# Patient Record
Sex: FEMALE | Race: WHITE | NOT HISPANIC OR LATINO | Employment: FULL TIME | URBAN - METROPOLITAN AREA
[De-identification: names, ages, dates, MRNs, and addresses within clinical notes are randomized per-mention and may not be internally consistent; named-entity substitution may affect disease eponyms.]

---

## 2017-05-05 ENCOUNTER — APPOINTMENT (OUTPATIENT)
Dept: WOMENS IMAGING | Facility: HOSPITAL | Age: 57
End: 2017-05-05

## 2017-05-05 PROCEDURE — 77063 BREAST TOMOSYNTHESIS BI: CPT | Performed by: RADIOLOGY

## 2017-05-05 PROCEDURE — 77067 SCR MAMMO BI INCL CAD: CPT | Performed by: RADIOLOGY

## 2017-05-05 PROCEDURE — 76642 ULTRASOUND BREAST LIMITED: CPT | Performed by: RADIOLOGY

## 2017-05-05 PROCEDURE — G0202 SCR MAMMO BI INCL CAD: HCPCS | Performed by: RADIOLOGY

## 2017-11-01 ENCOUNTER — OFFICE (OUTPATIENT)
Dept: URBAN - METROPOLITAN AREA CLINIC 42 | Facility: CLINIC | Age: 57
End: 2017-11-01

## 2017-11-01 VITALS
HEART RATE: 71 BPM | DIASTOLIC BLOOD PRESSURE: 85 MMHG | WEIGHT: 270 LBS | SYSTOLIC BLOOD PRESSURE: 135 MMHG | HEIGHT: 67 IN

## 2017-11-01 DIAGNOSIS — R94.5 ABNORMAL RESULTS OF LIVER FUNCTION STUDIES: ICD-10-CM

## 2017-11-01 DIAGNOSIS — K75.81 NONALCOHOLIC STEATOHEPATITIS (NASH): ICD-10-CM

## 2017-11-01 DIAGNOSIS — E66.9 OBESITY, UNSPECIFIED: ICD-10-CM

## 2017-11-01 PROCEDURE — 99243 OFF/OP CNSLTJ NEW/EST LOW 30: CPT

## 2017-11-01 RX ORDER — PENTOXIFYLLINE 400 MG/1
800 TABLET, EXTENDED RELEASE ORAL
Qty: 180 | Refills: 2 | Status: ACTIVE
Start: 2017-11-01

## 2017-11-01 RX ORDER — ZINC GLUCONATE 50 MG
50 TABLET ORAL
Qty: 90 | Refills: 3 | Status: ACTIVE
Start: 2017-11-01

## 2018-06-19 ENCOUNTER — APPOINTMENT (OUTPATIENT)
Dept: WOMENS IMAGING | Facility: HOSPITAL | Age: 58
End: 2018-06-19

## 2018-06-19 PROCEDURE — 77066 DX MAMMO INCL CAD BI: CPT | Performed by: RADIOLOGY

## 2018-06-19 PROCEDURE — G0279 TOMOSYNTHESIS, MAMMO: HCPCS | Performed by: RADIOLOGY

## 2018-06-19 PROCEDURE — 76641 ULTRASOUND BREAST COMPLETE: CPT | Performed by: RADIOLOGY

## 2018-06-19 PROCEDURE — 77062 BREAST TOMOSYNTHESIS BI: CPT | Performed by: RADIOLOGY

## 2018-09-17 ENCOUNTER — TELEPHONE (OUTPATIENT)
Dept: GASTROENTEROLOGY | Facility: CLINIC | Age: 58
End: 2018-09-17

## 2018-09-17 NOTE — TELEPHONE ENCOUNTER
Left message for patient to call back regarding upcoming OV. Need to ask if she has been seen anywhere for fatty liver so we can request notes/imaging.

## 2018-11-06 ENCOUNTER — OFFICE VISIT (OUTPATIENT)
Dept: FAMILY MEDICINE CLINIC | Facility: CLINIC | Age: 58
End: 2018-11-06

## 2018-11-06 VITALS
BODY MASS INDEX: 40.81 KG/M2 | WEIGHT: 260 LBS | HEART RATE: 72 BPM | RESPIRATION RATE: 18 BRPM | HEIGHT: 67 IN | DIASTOLIC BLOOD PRESSURE: 80 MMHG | SYSTOLIC BLOOD PRESSURE: 124 MMHG | OXYGEN SATURATION: 98 %

## 2018-11-06 DIAGNOSIS — E11.9 TYPE 2 DIABETES MELLITUS WITHOUT COMPLICATION, WITHOUT LONG-TERM CURRENT USE OF INSULIN (HCC): ICD-10-CM

## 2018-11-06 DIAGNOSIS — R10.9 FLANK PAIN: ICD-10-CM

## 2018-11-06 DIAGNOSIS — R35.0 URINARY FREQUENCY: Primary | ICD-10-CM

## 2018-11-06 LAB
BILIRUB BLD-MCNC: NEGATIVE MG/DL
CLARITY, POC: CLEAR
COLOR UR: YELLOW
GLUCOSE UR STRIP-MCNC: ABNORMAL MG/DL
KETONES UR QL: NEGATIVE
LEUKOCYTE EST, POC: NEGATIVE
NITRITE UR-MCNC: NEGATIVE MG/ML
PH UR: 6 [PH] (ref 5–8)
PROT UR STRIP-MCNC: NEGATIVE MG/DL
RBC # UR STRIP: NEGATIVE /UL
SP GR UR: 1.01 (ref 1–1.03)
UROBILINOGEN UR QL: NORMAL

## 2018-11-06 PROCEDURE — 81002 URINALYSIS NONAUTO W/O SCOPE: CPT | Performed by: FAMILY MEDICINE

## 2018-11-06 PROCEDURE — 99203 OFFICE O/P NEW LOW 30 MIN: CPT | Performed by: FAMILY MEDICINE

## 2018-11-06 RX ORDER — METFORMIN HYDROCHLORIDE 500 MG/1
500 TABLET, FILM COATED, EXTENDED RELEASE ORAL 2 TIMES DAILY
COMMUNITY
End: 2019-02-25 | Stop reason: SDUPTHER

## 2018-11-06 RX ORDER — NITROFURANTOIN 25; 75 MG/1; MG/1
100 CAPSULE ORAL EVERY 12 HOURS SCHEDULED
Qty: 6 CAPSULE | Refills: 0 | Status: SHIPPED | OUTPATIENT
Start: 2018-11-06 | End: 2018-11-09

## 2018-11-06 RX ORDER — DESVENLAFAXINE SUCCINATE 50 MG/1
50 TABLET, EXTENDED RELEASE ORAL
COMMUNITY
End: 2019-02-25 | Stop reason: SDUPTHER

## 2018-11-06 RX ORDER — LISINOPRIL 10 MG/1
10 TABLET ORAL DAILY
COMMUNITY
End: 2019-01-22 | Stop reason: SDUPTHER

## 2018-11-06 NOTE — PROGRESS NOTES
Subjective   Jemal Serrano is a 58 y.o. female.     Chief Complaint   Patient presents with   • Urinary Frequency   • Nocturia   • Establish Care       HPI     Possible UTI:  -started 1 week ago  -urinary frequency & urgency but no dysuria  -mild nocturia  -R flank pain, which is how previous kidney infections have presented, though this pain is better today compared to the last few days  -no hx of kidney stones    PMH also notable for HTN and T2DM    HTN:  -previously treated with lisinopril-HCTZ which was changed to plain lisinopril 10 mg daily a few weeks ago due to iatrogenic hypotension and dizziness  -she is tolerating this regimen well and dizziness has resolved  -no chest pain, dizziness, headaches, or acute vision changes     Type 2 diabetes:  -diagnosed a few years ago  -she has lost 30 lb with dietary changes in the last year  -A1C 8.4% on 10/19/18, outside labs reviewed and scanned  -she had taken herself off metformin for about 6 months and resumed taking 500 mg once daily about 1 week ago    Transferring care from [facility]  She follows at [facility] for OB/GYN care         Review of Systems   Constitutional: Positive for fatigue. Negative for appetite change and fever.   HENT: Negative for congestion and sore throat.    Eyes: Negative for pain and visual disturbance.   Respiratory: Negative for cough and shortness of breath.    Cardiovascular: Negative for chest pain and palpitations.   Gastrointestinal: Negative for abdominal pain, diarrhea, nausea and vomiting.   Genitourinary: Positive for frequency and urgency. Negative for dysuria, hematuria and vaginal discharge (no itching).   Skin: Negative for rash and wound.   Neurological: Negative for dizziness and headaches.       The following portions of the patient's history were reviewed and updated as appropriate: allergies, current medications, past family history, past medical history, past social history, past  surgical history and problem list.    Past Medical History:   Diagnosis Date   • Aortic aneurysm (CMS/HCC)     followed by [facility]   • Depression    • Diabetes mellitus (CMS/HCC)    • Fatty liver    • Hypertension    • Melanoma (CMS/HCC)     s/p surgical removal, followed by [facility]   • Pyelonephritis    • Sleep apnea     compliant with CPAP   • Visual impairment     followed by [facility]     Past Surgical History:   Procedure Laterality Date   •  SECTION      x 2   • HYSTERECTOMY      Complete hysterectomy due to a large ovarian cyst and menorrhagia   • LUMBAR DISC SURGERY     • UMBILICAL HERNIA REPAIR       Family History   Problem Relation Age of Onset   • Irregular heart beat Mother    • Hypertension Father    • Breast cancer Sister    • Irregular heart beat Sister    • Leukemia Maternal Grandmother    • Heart attack Paternal Grandfather        Allergies   Allergen Reactions   • Adhesive Tape Other (See Comments)     Redness when left on a while CAN USE PAPER TAPE   • Codeine Nausea And Vomiting   • Latex Rash   • Sulfa Antibiotics Itching          Current Outpatient Prescriptions:   •  desvenlafaxine (PRISTIQ) 50 MG 24 hr tablet, Take 50 mg by mouth., Disp: , Rfl:   •  lisinopril (PRINIVIL,ZESTRIL) 10 MG tablet, Take 10 mg by mouth Daily., Disp: , Rfl:   •  metFORMIN (GLUMETZA) 500 MG (MOD) 24 hr tablet, Take 500 mg by mouth 2 (Two) Times a Day., Disp: , Rfl:     Objective     Vitals:    18 1315   BP: 124/80   Pulse: 72   Resp: 18   SpO2: 98%     BMI 41    Physical Exam   Constitutional: She appears well-developed and well-nourished. No distress.   HENT:   Head: Normocephalic and atraumatic.   Cardiovascular: Normal rate, regular rhythm and normal heart sounds.  Exam reveals no gallop and no friction rub.    No murmur heard.  Pulmonary/Chest: Effort normal and breath sounds normal. No respiratory distress. She has no wheezes. She has no rhonchi. She has no rales.    Abdominal: Soft. Bowel sounds are normal. She exhibits no distension. There is tenderness in the suprapubic area. There is no rigidity, no rebound, no guarding and no CVA tenderness.   Skin: Skin is warm and dry.       ASSESSMENT/PLAN             Visit Diagnoses     Urinary frequency  and R Flank pain      Relevant Medications    Will treat empirically for a UTI with a 3 day course of nitrofurantoin, macrocrystal-monohydrate, (MACROBID) 100 MG BID, pending culture results    Other Relevant Orders    POC Urinalysis Dipstick (Completed) with 2+ glucose    Urine Culture - Urine, Urine, Clean Catch                      Type 2 diabetes mellitus without complication, without long-term current use of insulin (CMS/[facility])        Relevant Medications    Increase metFORMIN (GLUMETZA) to 500 MG BID  microalbumin/Cr          Records requested    Patient Instructions   Increase your metformin to 500 mg twice daily.      Return in about 3 months (around 2/6/2019) for Recheck T2DM.      Nilda Nayak MD  11/06/18

## 2018-11-07 LAB
ALBUMIN/CREAT UR: <5.8 MG/G CREAT (ref 0–30)
CREAT UR-MCNC: 51.5 MG/DL
MICROALBUMIN UR-MCNC: <3 UG/ML

## 2018-11-08 LAB
BACTERIA UR CULT: NORMAL
BACTERIA UR CULT: NORMAL

## 2018-11-20 ENCOUNTER — HOSPITAL ENCOUNTER (OUTPATIENT)
Dept: GENERAL RADIOLOGY | Facility: HOSPITAL | Age: 58
Discharge: HOME | End: 2018-11-20
Admitting: FAMILY MEDICINE

## 2018-11-20 ENCOUNTER — OFFICE VISIT (OUTPATIENT)
Dept: FAMILY MEDICINE CLINIC | Facility: CLINIC | Age: 58
End: 2018-11-20

## 2018-11-20 VITALS
BODY MASS INDEX: 40.02 KG/M2 | SYSTOLIC BLOOD PRESSURE: 126 MMHG | OXYGEN SATURATION: 98 % | RESPIRATION RATE: 13 BRPM | HEART RATE: 57 BPM | HEIGHT: 67 IN | WEIGHT: 255 LBS | DIASTOLIC BLOOD PRESSURE: 86 MMHG

## 2018-11-20 DIAGNOSIS — E11.42 DIABETIC POLYNEUROPATHY ASSOCIATED WITH TYPE 2 DIABETES MELLITUS (HCC): ICD-10-CM

## 2018-11-20 DIAGNOSIS — M25.572 ACUTE LEFT ANKLE PAIN: ICD-10-CM

## 2018-11-20 DIAGNOSIS — M25.572 ACUTE LEFT ANKLE PAIN: Primary | ICD-10-CM

## 2018-11-20 PROCEDURE — 73610 X-RAY EXAM OF ANKLE: CPT

## 2018-11-20 PROCEDURE — 99213 OFFICE O/P EST LOW 20 MIN: CPT | Performed by: FAMILY MEDICINE

## 2018-11-20 NOTE — PROGRESS NOTES
Subjective   Jemal Serrano is a 58 y.o. female.     Chief Complaint   Patient presents with   • Foot Pain       HPI     Left foot pain:  -started about 5 days ago  -pain is located toward the anterior of the left ankle  -associated with swelling which worsens by the end of the day  -no known injuries  -no open wounds  -she does have a hx of T2DM but no known hx of neuropathy  -no difficulty with ambulation       Review of Systems   Constitutional: Negative for fatigue and fever.   HENT: Negative for congestion and sore throat.    Respiratory: Negative for cough and shortness of breath.    Cardiovascular: Negative for chest pain and palpitations.   Gastrointestinal: Negative for abdominal pain and nausea.   Musculoskeletal: Positive for arthralgias.   Neurological: Negative for dizziness and headaches.       The following portions of the patient's history were reviewed and updated as appropriate: allergies, current medications, past family history, past medical history, past social history, past surgical history and problem list.    Past Medical History:   Diagnosis Date   • Aortic aneurysm (CMS/HCC)     followed by [facility]   • Depression    • Diabetes mellitus (CMS/HCC)    • Fatty liver    • Hypertension    • Melanoma (CMS/HCC)     s/p surgical removal, followed by [facility]   • Pyelonephritis    • Sleep apnea     compliant with CPAP   • Visual impairment     followed by [facility]     Past Surgical History:   Procedure Laterality Date   •  SECTION      x 2   • HYSTERECTOMY      Complete hysterectomy due to a large ovarian cyst and menorrhagia   • LUMBAR DISC SURGERY     • UMBILICAL HERNIA REPAIR         Social History     Tobacco Use   • Smoking status: Never Smoker   • Smokeless tobacco: Never Used   Substance and Sexual Activity   • Alcohol use: No   • Drug use: Not on file   \    Allergies   Allergen Reactions   • Adhesive Tape Other (See Comments)     Redness when left on a  while CAN USE PAPER TAPE   • Codeine Nausea And Vomiting   • Latex Rash   • Sulfa Antibiotics Itching        Outpatient Medications Prior to Visit   Medication Sig Dispense Refill   • desvenlafaxine (PRISTIQ) 50 MG 24 hr tablet Take 50 mg by mouth.     • lisinopril (PRINIVIL,ZESTRIL) 10 MG tablet Take 10 mg by mouth Daily.     • metFORMIN (GLUMETZA) 500 MG (MOD) 24 hr tablet Take 500 mg by mouth 2 (Two) Times a Day.       No facility-administered medications prior to visit.        Objective     Vitals:    11/20/18 0759   BP: 126/86   Pulse: 57   Resp: 13   SpO2: 98%       Physical Exam   Constitutional: She appears well-developed and well-nourished. No distress.   HENT:   Head: Normocephalic and atraumatic.   Cardiovascular: Normal rate, regular rhythm and normal heart sounds. Exam reveals no gallop and no friction rub.   No murmur heard.  Pulmonary/Chest: Effort normal and breath sounds normal. No respiratory distress. She has no wheezes. She has no rhonchi. She has no rales.    Jemal had a diabetic foot exam performed today.   During the foot exam she had a monofilament test performed (diminished sensation over bilateral heels).  Vascular Status -  Her right foot exhibits normal foot vasculature  and no edema. Her left foot exhibits abnormal foot edema (trace). Her left foot exhibits normal foot vasculature .  Skin Integrity  -  Her right foot skin is intact.Her left foot skin is intact (mild bruising of left anterior ankle but no point tenderness)..  Skin: Skin is warm and dry.       ASSESSMENT/PLAN             Visit Diagnoses     Acute left ankle pain    -  Primary, exam most consistent with a mild sprain, but will obtain plain films to rule out occult fracture    Relevant Orders    XR Ankle 3+ View Left    Diabetic polyneuropathy associated with type 2 diabetes mellitus (CMS/[facility])        Relevant Orders    XR Ankle 3+ View Left            Patient Instructions   Ice your ankle 15-20 minutes each night, wrap the  ankle with an ACE bandage, elevated the foot while sitting, and try to rest until the swelling goes down completely.       AlternateTylenol and Ibuprofen every 4-6 hours as needed for pain.    Wash and inspect your feet for wounds every night.      Ankle Sprain  An ankle sprain is a stretch or tear in one of the tough, fiber-like tissues (ligaments) in the ankle. The ligaments in your ankle help to hold the bones of the ankle together.  What are the causes?  This condition is often caused by stepping on or falling on the outer edge of the foot.  What increases the risk?  This condition is more likely to develop in people who play sports.  What are the signs or symptoms?  Symptoms of this condition include:  · Pain in your ankle.  · Swelling.  · Bruising. Bruising may develop right after you sprain your ankle or 1-2 days later.  · Trouble standing or walking, especially when you turn or change directions.    How is this diagnosed?  This condition is diagnosed with a physical exam. During the exam, your health care provider will press on certain parts of your foot and ankle and try to move them in certain ways. X-rays may be taken to see how severe the sprain is and to check for broken bones.  How is this treated?  This condition may be treated with:  · A brace. This is used to keep the ankle from moving until it heals.  · An elastic bandage. This is used to support the ankle.  · Crutches.  · Pain medicine.  · Surgery. This may be needed if the sprain is severe.  · Physical therapy. This may help to improve the range of motion in the ankle.    Follow these instructions at home:  · Rest your ankle.  · Take over-the-counter and prescription medicines only as told by your health care provider.  · For 2-3 days, keep your ankle raised (elevated) above the level of your heart as much as possible.  · If directed, apply ice to the area:  ? Put ice in a plastic bag.  ? Place a towel between your skin and the bag.  ? Leave the  ice on for 20 minutes, 2-3 times a day.  · If you were given a brace:  ? Wear it as directed.  ? Remove it to shower or bathe.  ? Try not to move your ankle much, but wiggle your toes from time to time. This helps to prevent swelling.  · If you were given an elastic bandage (dressing):  ? Remove it to shower or bathe.  ? Try not to move your ankle much, but wiggle your toes from time to time. This helps to prevent swelling.  ? Adjust the dressing to make it more comfortable if it feels too tight.  ? Loosen the dressing if you have numbness or tingling in your foot, or if your foot becomes cold and blue.  · If you have crutches, use them as told by your health care provider. Continue to use them until you can walk without feeling pain in your ankle.  Contact a health care provider if:  · You have rapidly increasing bruising or swelling.  · Your pain is not relieved with medicine.  Get help right away if:  · Your toes or foot becomes numb or blue.  · You have severe pain that gets worse.  This information is not intended to replace advice given to you by your health care provider. Make sure you discuss any questions you have with your health care provider.  Document Released: 12/18/2006 Document Revised: 01/25/2018 Document Reviewed: 07/19/2016  [employer] Interactive Patient Education © 2018 [employer]        Diabetic Neuropathy  Diabetic neuropathy is a nerve disease or nerve damage that is caused by diabetes mellitus. About half of all people with diabetes mellitus have some form of nerve damage. Nerve damage is more common in those who have had diabetes mellitus for many years and who generally have not had good control of their blood sugar (glucose) level. Diabetic neuropathy is a common complication of diabetes mellitus. There are three common types of diabetic neuropathy and a fourth type that is less common and less understood:  · Peripheral neuropathy--This is the most common type of diabetic neuropathy. It  causes damage to the nerves of the feet and legs first and then eventually the hands and arms. The damage affects the ability to sense touch.  · Autonomic neuropathy--This type causes damage to the autonomic nervous system, which controls the following functions:  ? Heartbeat.  ? Body temperature.  ? Blood pressure.  ? Urination.  ? Digestion.  ? Sweating.  ? Sexual function.  · Focal neuropathy--Focal neuropathy can be painful and unpredictable and occurs most often in older adults with diabetes mellitus. It involves a specific nerve or one area and often comes on suddenly. It usually does not cause long-term problems.  · Radiculoplexus neuropathy-- Sometimes called lumbosacral radiculoplexus neuropathy, radiculoplexus neuropathy affects the nerves of the thighs, hips, buttocks, or legs. It is more common in people with type 2 diabetes mellitus and in older men. It is characterized by debilitating pain, weakness, and atrophy, usually in the thigh muscles.    What are the causes?  The cause of peripheral, autonomic, and focal neuropathies is diabetes mellitus that is uncontrolled and high glucose levels. The cause of radiculoplexus neuropathy is unknown. However, it is thought to be caused by inflammation related to uncontrolled glucose levels.  What are the signs or symptoms?  Peripheral Neuropathy  Peripheral neuropathy develops slowly over time. When the nerves of the feet and legs no longer work there may be:  · Burning, stabbing, or aching pain in the legs or feet.  · Inability to feel pressure or pain in your feet. This can lead to:  ? Thick calluses over pressure areas.  ? Pressure sores.  ? Ulcers.  · Foot deformities.  · Reduced ability to feel temperature changes.  · Muscle weakness.    Autonomic Neuropathy  The symptoms of autonomic neuropathy vary depending on which nerves are affected. Symptoms may include:  · Problems with digestion, such as:  ? Feeling sick to your stomach  (nausea).  ? Vomiting.  ? Bloating.  ? Constipation.  ? Diarrhea.  ? Abdominal pain.  · Difficulty with urination. This occurs if you lose your ability to sense when your bladder is full. Problems include:  ? Urine leakage (incontinence).  ? Inability to empty your bladder completely (retention).  · Rapid or irregular heartbeat (palpitations).  · Blood pressure drops when you stand up (orthostatic hypotension). When you stand up you may feel:  ? Dizzy.  ? Weak.  ? Faint.  · In men, inability to attain and maintain an erection.  · In women, vaginal dryness and problems with decreased sexual desire and arousal.  · Problems with body temperature regulation.  · Increased or decreased sweating.    Focal Neuropathy  · Abnormal eye movements or abnormal alignment of both eyes.  · Weakness in the wrist.  · Foot drop. This results in an inability to lift the foot properly and abnormal walking or foot movement.  · Paralysis on one side of your face (Bell palsy).  · Chest or abdominal pain.  Radiculoplexus Neuropathy  · Sudden, severe pain in your hip, thigh, or buttocks.  · Weakness and wasting of thigh muscles.  · Difficulty rising from a seated position.  · Abdominal swelling.  · Unexplained weight loss (usually more than 10 lb [4.5 kg]).  How is this diagnosed?  Peripheral Neuropathy  Your senses may be tested. Sensory function testing can be done with:  · A light touch using a monofilament.  · A vibration with tuning fork.  · A sharp sensation with a pin prick.    Other tests that can help diagnose neuropathy are:  · Nerve conduction velocity. This test checks the transmission of an electrical current through a nerve.  · Electromyography. This shows how muscles respond to electrical signals transmitted by nearby nerves.  · Quantitative sensory testing. This is used to assess how your nerves respond to vibrations and changes in temperature.    Autonomic Neuropathy  Diagnosis is often based on reported symptoms. Tell your  health care provider if you experience:  · Dizziness.  · Constipation.  · Diarrhea.  · Inappropriate urination or inability to urinate.  · Inability to get or maintain an erection.    Tests that may be done include:  · Electrocardiography or Holter monitor. These are tests that can help show problems with the heart rate or heart rhythm.  · An X-ray exam may be done.    Focal Neuropathy  Diagnosis is made based on your symptoms and what your health care provider finds during your exam. Other tests may be done. They may include:  · Nerve conduction velocities. This checks the transmission of electrical current through a nerve.  · Electromyography. This shows how muscles respond to electrical signals transmitted by nearby nerves.  · Quantitative sensory testing. This test is used to assess how your nerves respond to vibration and changes in temperature.    Radiculoplexus Neuropathy  · Often the first thing is to eliminate any other issue or problems that might be the cause, as there is no standard test for diagnosis.  · X-ray exam of your spine and lumbar region.  · Spinal tap to rule out cancer.  · MRI to rule out other lesions.  How is this treated?  Once nerve damage occurs, it cannot be reversed. The goal of treatment is to keep the disease or nerve damage from getting worse and affecting more nerve fibers. Controlling your blood glucose level is the key. Most people with radiculoplexus neuropathy see at least a partial improvement over time. You will need to keep your blood glucose and HbA1c levels in the target range determined by your health care provider. Things that help control blood glucose levels include:  · Blood glucose monitoring.  · Meal planning.  · Physical activity.  · Diabetes medicine.    Over time, maintaining lower blood glucose levels helps lessen symptoms. Sometimes, prescription pain medicine is needed.  Follow these instructions at home:  · Do not smoke.  · Keep your blood glucose level in  the range that you and your health care provider have determined acceptable for you.  · Keep your blood pressure level in the range that you and your health care provider have determined acceptable for you.  · Eat a well-balanced diet.  · Be physically active every day. Include strength training and balance exercises.  · Protect your feet.  ? Check your feet every day for sores, cuts, blisters, or signs of infection.  ? Wear padded socks and supportive shoes. Use orthotic inserts, if necessary.  ? Regularly check the insides of your shoes for worn spots. Make sure there are no rocks or other items inside your shoes before you put them on.  Contact a health care provider if:  · You have burning, stabbing, or aching pain in the legs or feet.  · You are unable to feel pressure or pain in your feet.  · You develop problems with digestion such as:  ? Nausea.  ? Vomiting.  ? Bloating.  ? Constipation.  ? Diarrhea.  ? Abdominal pain.  · You have difficulty with urination, such as:  ? Incontinence.  ? Retention.  · You have palpitations.  · You develop orthostatic hypotension. When you stand up you may feel:  ? Dizzy.  ? Weak.  ? Faint.  · You cannot attain and maintain an erection (in men).  · You have vaginal dryness and problems with decreased sexual desire and arousal (in women).  · You have severe pain in your thighs, legs, or buttocks.  · You have unexplained weight loss.  This information is not intended to replace advice given to you by your health care provider. Make sure you discuss any questions you have with your health care provider.  Document Released: 02/26/2003 Document Revised: 05/25/2017 Document Reviewed: 05/29/2014  [employer] Interactive Patient Education © 2017 [employer]      Return in about 2 months (around 1/20/2019) for Recheck T2DM.      Nilda Nayak MD  11/20/18

## 2018-11-20 NOTE — PATIENT INSTRUCTIONS
Ice your ankle 15-20 minutes each night, wrap the ankle with an ACE bandage, elevated the foot while sitting, and try to rest until the swelling goes down completely.       AlternateTylenol and Ibuprofen every 4-6 hours as needed for pain.    Wash and inspect your feet for wounds every night.      Ankle Sprain  An ankle sprain is a stretch or tear in one of the tough, fiber-like tissues (ligaments) in the ankle. The ligaments in your ankle help to hold the bones of the ankle together.  What are the causes?  This condition is often caused by stepping on or falling on the outer edge of the foot.  What increases the risk?  This condition is more likely to develop in people who play sports.  What are the signs or symptoms?  Symptoms of this condition include:  · Pain in your ankle.  · Swelling.  · Bruising. Bruising may develop right after you sprain your ankle or 1-2 days later.  · Trouble standing or walking, especially when you turn or change directions.    How is this diagnosed?  This condition is diagnosed with a physical exam. During the exam, your health care provider will press on certain parts of your foot and ankle and try to move them in certain ways. X-rays may be taken to see how severe the sprain is and to check for broken bones.  How is this treated?  This condition may be treated with:  · A brace. This is used to keep the ankle from moving until it heals.  · An elastic bandage. This is used to support the ankle.  · Crutches.  · Pain medicine.  · Surgery. This may be needed if the sprain is severe.  · Physical therapy. This may help to improve the range of motion in the ankle.    Follow these instructions at home:  · Rest your ankle.  · Take over-the-counter and prescription medicines only as told by your health care provider.  · For 2-3 days, keep your ankle raised (elevated) above the level of your heart as much as possible.  · If directed, apply ice to the area:  ? Put ice in a plastic bag.  ? Place a  towel between your skin and the bag.  ? Leave the ice on for 20 minutes, 2-3 times a day.  · If you were given a brace:  ? Wear it as directed.  ? Remove it to shower or bathe.  ? Try not to move your ankle much, but wiggle your toes from time to time. This helps to prevent swelling.  · If you were given an elastic bandage (dressing):  ? Remove it to shower or bathe.  ? Try not to move your ankle much, but wiggle your toes from time to time. This helps to prevent swelling.  ? Adjust the dressing to make it more comfortable if it feels too tight.  ? Loosen the dressing if you have numbness or tingling in your foot, or if your foot becomes cold and blue.  · If you have crutches, use them as told by your health care provider. Continue to use them until you can walk without feeling pain in your ankle.  Contact a health care provider if:  · You have rapidly increasing bruising or swelling.  · Your pain is not relieved with medicine.  Get help right away if:  · Your toes or foot becomes numb or blue.  · You have severe pain that gets worse.  This information is not intended to replace advice given to you by your health care provider. Make sure you discuss any questions you have with your health care provider.  Document Released: 12/18/2006 Document Revised: 01/25/2018 Document Reviewed: 07/19/2016  [employer] Interactive Patient Education © 2018 [employer]        Diabetic Neuropathy  Diabetic neuropathy is a nerve disease or nerve damage that is caused by diabetes mellitus. About half of all people with diabetes mellitus have some form of nerve damage. Nerve damage is more common in those who have had diabetes mellitus for many years and who generally have not had good control of their blood sugar (glucose) level. Diabetic neuropathy is a common complication of diabetes mellitus. There are three common types of diabetic neuropathy and a fourth type that is less common and less understood:  · Peripheral neuropathy--This  is the most common type of diabetic neuropathy. It causes damage to the nerves of the feet and legs first and then eventually the hands and arms. The damage affects the ability to sense touch.  · Autonomic neuropathy--This type causes damage to the autonomic nervous system, which controls the following functions:  ? Heartbeat.  ? Body temperature.  ? Blood pressure.  ? Urination.  ? Digestion.  ? Sweating.  ? Sexual function.  · Focal neuropathy--Focal neuropathy can be painful and unpredictable and occurs most often in older adults with diabetes mellitus. It involves a specific nerve or one area and often comes on suddenly. It usually does not cause long-term problems.  · Radiculoplexus neuropathy-- Sometimes called lumbosacral radiculoplexus neuropathy, radiculoplexus neuropathy affects the nerves of the thighs, hips, buttocks, or legs. It is more common in people with type 2 diabetes mellitus and in older men. It is characterized by debilitating pain, weakness, and atrophy, usually in the thigh muscles.    What are the causes?  The cause of peripheral, autonomic, and focal neuropathies is diabetes mellitus that is uncontrolled and high glucose levels. The cause of radiculoplexus neuropathy is unknown. However, it is thought to be caused by inflammation related to uncontrolled glucose levels.  What are the signs or symptoms?  Peripheral Neuropathy  Peripheral neuropathy develops slowly over time. When the nerves of the feet and legs no longer work there may be:  · Burning, stabbing, or aching pain in the legs or feet.  · Inability to feel pressure or pain in your feet. This can lead to:  ? Thick calluses over pressure areas.  ? Pressure sores.  ? Ulcers.  · Foot deformities.  · Reduced ability to feel temperature changes.  · Muscle weakness.    Autonomic Neuropathy  The symptoms of autonomic neuropathy vary depending on which nerves are affected. Symptoms may include:  · Problems with digestion, such  as:  ? Feeling sick to your stomach (nausea).  ? Vomiting.  ? Bloating.  ? Constipation.  ? Diarrhea.  ? Abdominal pain.  · Difficulty with urination. This occurs if you lose your ability to sense when your bladder is full. Problems include:  ? Urine leakage (incontinence).  ? Inability to empty your bladder completely (retention).  · Rapid or irregular heartbeat (palpitations).  · Blood pressure drops when you stand up (orthostatic hypotension). When you stand up you may feel:  ? Dizzy.  ? Weak.  ? Faint.  · In men, inability to attain and maintain an erection.  · In women, vaginal dryness and problems with decreased sexual desire and arousal.  · Problems with body temperature regulation.  · Increased or decreased sweating.    Focal Neuropathy  · Abnormal eye movements or abnormal alignment of both eyes.  · Weakness in the wrist.  · Foot drop. This results in an inability to lift the foot properly and abnormal walking or foot movement.  · Paralysis on one side of your face (Bell palsy).  · Chest or abdominal pain.  Radiculoplexus Neuropathy  · Sudden, severe pain in your hip, thigh, or buttocks.  · Weakness and wasting of thigh muscles.  · Difficulty rising from a seated position.  · Abdominal swelling.  · Unexplained weight loss (usually more than 10 lb [4.5 kg]).  How is this diagnosed?  Peripheral Neuropathy  Your senses may be tested. Sensory function testing can be done with:  · A light touch using a monofilament.  · A vibration with tuning fork.  · A sharp sensation with a pin prick.    Other tests that can help diagnose neuropathy are:  · Nerve conduction velocity. This test checks the transmission of an electrical current through a nerve.  · Electromyography. This shows how muscles respond to electrical signals transmitted by nearby nerves.  · Quantitative sensory testing. This is used to assess how your nerves respond to vibrations and changes in temperature.    Autonomic Neuropathy  Diagnosis is often  based on reported symptoms. Tell your health care provider if you experience:  · Dizziness.  · Constipation.  · Diarrhea.  · Inappropriate urination or inability to urinate.  · Inability to get or maintain an erection.    Tests that may be done include:  · Electrocardiography or Holter monitor. These are tests that can help show problems with the heart rate or heart rhythm.  · An X-ray exam may be done.    Focal Neuropathy  Diagnosis is made based on your symptoms and what your health care provider finds during your exam. Other tests may be done. They may include:  · Nerve conduction velocities. This checks the transmission of electrical current through a nerve.  · Electromyography. This shows how muscles respond to electrical signals transmitted by nearby nerves.  · Quantitative sensory testing. This test is used to assess how your nerves respond to vibration and changes in temperature.    Radiculoplexus Neuropathy  · Often the first thing is to eliminate any other issue or problems that might be the cause, as there is no standard test for diagnosis.  · X-ray exam of your spine and lumbar region.  · Spinal tap to rule out cancer.  · MRI to rule out other lesions.  How is this treated?  Once nerve damage occurs, it cannot be reversed. The goal of treatment is to keep the disease or nerve damage from getting worse and affecting more nerve fibers. Controlling your blood glucose level is the key. Most people with radiculoplexus neuropathy see at least a partial improvement over time. You will need to keep your blood glucose and HbA1c levels in the target range determined by your health care provider. Things that help control blood glucose levels include:  · Blood glucose monitoring.  · Meal planning.  · Physical activity.  · Diabetes medicine.    Over time, maintaining lower blood glucose levels helps lessen symptoms. Sometimes, prescription pain medicine is needed.  Follow these instructions at home:  · Do not  smoke.  · Keep your blood glucose level in the range that you and your health care provider have determined acceptable for you.  · Keep your blood pressure level in the range that you and your health care provider have determined acceptable for you.  · Eat a well-balanced diet.  · Be physically active every day. Include strength training and balance exercises.  · Protect your feet.  ? Check your feet every day for sores, cuts, blisters, or signs of infection.  ? Wear padded socks and supportive shoes. Use orthotic inserts, if necessary.  ? Regularly check the insides of your shoes for worn spots. Make sure there are no rocks or other items inside your shoes before you put them on.  Contact a health care provider if:  · You have burning, stabbing, or aching pain in the legs or feet.  · You are unable to feel pressure or pain in your feet.  · You develop problems with digestion such as:  ? Nausea.  ? Vomiting.  ? Bloating.  ? Constipation.  ? Diarrhea.  ? Abdominal pain.  · You have difficulty with urination, such as:  ? Incontinence.  ? Retention.  · You have palpitations.  · You develop orthostatic hypotension. When you stand up you may feel:  ? Dizzy.  ? Weak.  ? Faint.  · You cannot attain and maintain an erection (in men).  · You have vaginal dryness and problems with decreased sexual desire and arousal (in women).  · You have severe pain in your thighs, legs, or buttocks.  · You have unexplained weight loss.  This information is not intended to replace advice given to you by your health care provider. Make sure you discuss any questions you have with your health care provider.  Document Released: 02/26/2003 Document Revised: 05/25/2017 Document Reviewed: 05/29/2014  [employer] Interactive Patient Education © 2017 [employer]

## 2019-01-22 ENCOUNTER — TELEPHONE (OUTPATIENT)
Dept: FAMILY MEDICINE CLINIC | Facility: CLINIC | Age: 59
End: 2019-01-22

## 2019-01-22 RX ORDER — LISINOPRIL 10 MG/1
10 TABLET ORAL DAILY
Qty: 30 TABLET | Refills: 1 | Status: SHIPPED | OUTPATIENT
Start: 2019-01-22 | End: 2019-02-25 | Stop reason: SDUPTHER

## 2019-01-22 NOTE — TELEPHONE ENCOUNTER
Patient left a  wanting something called in for a sinus infection. She said it started out as a cold, but now the drainage has turned green. She reports that she is not running a fever or body aches, not even really having a sore throat.

## 2019-01-23 ENCOUNTER — OFFICE VISIT (OUTPATIENT)
Dept: FAMILY MEDICINE CLINIC | Facility: CLINIC | Age: 59
End: 2019-01-23

## 2019-01-23 VITALS
HEART RATE: 71 BPM | SYSTOLIC BLOOD PRESSURE: 126 MMHG | DIASTOLIC BLOOD PRESSURE: 86 MMHG | HEIGHT: 67 IN | OXYGEN SATURATION: 96 % | RESPIRATION RATE: 14 BRPM | BODY MASS INDEX: 39.93 KG/M2

## 2019-01-23 DIAGNOSIS — J02.9 PHARYNGITIS, UNSPECIFIED ETIOLOGY: ICD-10-CM

## 2019-01-23 DIAGNOSIS — J01.00 ACUTE MAXILLARY SINUSITIS, RECURRENCE NOT SPECIFIED: Primary | ICD-10-CM

## 2019-01-23 PROCEDURE — 99213 OFFICE O/P EST LOW 20 MIN: CPT | Performed by: FAMILY MEDICINE

## 2019-01-23 RX ORDER — AZITHROMYCIN 250 MG/1
TABLET, FILM COATED ORAL
Qty: 6 TABLET | Refills: 0 | Status: SHIPPED | OUTPATIENT
Start: 2019-01-23 | End: 2019-02-25

## 2019-01-23 NOTE — PROGRESS NOTES
Subjective   Jemal Serrano is a 58 y.o. female.     Chief Complaint   Patient presents with   • URI       HPI        Sick:  -started 5 days ago with clear rhinorrhea  -yesterday she developed sinus pressure and the mucus became thick and green  -associated with a dry cough  -no fevers  -sick contacts include 2 of her grandsons who attend   -she has been taking OTC Ibuprofen and Alkaseltzer plus with some improvement in symptoms      Review of Systems   Constitutional: Negative for appetite change and fever.   HENT: Positive for ear discharge, ear pain, postnasal drip, rhinorrhea, sinus pressure and sinus pain. Negative for sore throat.    Respiratory: Positive for cough. Negative for shortness of breath.    Cardiovascular: Negative for chest pain and palpitations.   Gastrointestinal: Negative for diarrhea and vomiting.   Genitourinary: Negative for dysuria.   Musculoskeletal: Negative for arthralgias and myalgias.   Skin: Negative for rash.   Neurological: Positive for headaches. Negative for dizziness.       The following portions of the patient's history were reviewed and updated as appropriate: allergies, current medications, past family history, past medical history, past social history, past surgical history and problem list.    Past Medical History:   Diagnosis Date   • Aortic aneurysm (CMS/HCC)     followed by [facility]   • Depression    • Diabetes mellitus (CMS/HCC)    • Fatty liver    • Hypertension    • Melanoma (CMS/HCC)     s/p surgical removal, followed by [facility]   • Pyelonephritis    • Sleep apnea     compliant with CPAP   • Visual impairment     followed by [facility]            Allergies   Allergen Reactions   • Adhesive Tape Other (See Comments)     Redness when left on a while CAN USE PAPER TAPE   • Codeine Nausea And Vomiting   • Latex Rash   • Sulfa Antibiotics Itching        Outpatient Medications Prior to Visit   Medication Sig Dispense Refill   •  desvenlafaxine (PRISTIQ) 50 MG 24 hr tablet Take 50 mg by mouth.     • lisinopril (PRINIVIL,ZESTRIL) 10 MG tablet Take 1 tablet by mouth Daily. 30 tablet 1   • metFORMIN (GLUMETZA) 500 MG (MOD) 24 hr tablet Take 500 mg by mouth 2 (Two) Times a Day.       No facility-administered medications prior to visit.        Objective     Vitals:    01/23/19 1137   BP: 126/86   Pulse: 71   Resp: 14   SpO2: 96%       Physical Exam   Constitutional: She appears well-developed and well-nourished. No distress.   HENT:   Head: Normocephalic and atraumatic.   Right Ear: Tympanic membrane and ear canal normal.   Left Ear: Tympanic membrane and ear canal normal.   Nose: Right sinus exhibits maxillary sinus tenderness. Left sinus exhibits maxillary sinus tenderness.   Mouth/Throat: Uvula is midline. Posterior oropharyngeal erythema present. Tonsils are 1+ on the right. Tonsils are 1+ on the left. Tonsillar exudate.   Neck: No thyromegaly present.   Cardiovascular: Normal rate, regular rhythm and normal heart sounds. Exam reveals no gallop and no friction rub.   No murmur heard.  Pulmonary/Chest: Effort normal and breath sounds normal. No respiratory distress. She has no wheezes. She has no rhonchi. She has no rales.   Abdominal: Soft. Bowel sounds are normal. She exhibits no distension. There is no tenderness.   Lymphadenopathy:     She has no cervical adenopathy.   Skin: Skin is warm and dry.       ASSESSMENT/PLAN       Problem List Items Addressed This Visit     None      Visit Diagnoses     Acute maxillary sinusitis, recurrence not specified    -  Primary    Relevant Medications    azithromycin (ZITHROMAX Z-ALIDA) 250 MG tablet    Pharyngitis, unspecified etiology        Relevant Medications    azithromycin (ZITHROMAX Z-ALIDA) 250 MG tablet            Patient Instructions   Stay home from work tomorrow    Sinusitis, Adult  Sinusitis is soreness and inflammation of your sinuses. Sinuses are hollow spaces in the bones around your face.  Your sinuses are located:  · Around your eyes.  · In the middle of your forehead.  · Behind your nose.  · In your cheekbones.    Your sinuses and nasal passages are lined with a stringy fluid (mucus). Mucus normally drains out of your sinuses. When your nasal tissues become inflamed or swollen, the mucus can become trapped or blocked so air cannot flow through your sinuses. This allows bacteria, viruses, and funguses to grow, which leads to infection.  Sinusitis can develop quickly and last for 7?10 days (acute) or for more than 12 weeks (chronic). Sinusitis often develops after a cold.  What are the causes?  This condition is caused by anything that creates swelling in the sinuses or stops mucus from draining, including:  · Allergies.  · Asthma.  · Bacterial or viral infection.  · Abnormally shaped bones between the nasal passages.  · Nasal growths that contain mucus (nasal polyps).  · Narrow sinus openings.  · Pollutants, such as chemicals or irritants in the air.  · A foreign object stuck in the nose.  · A fungal infection. This is rare.    What increases the risk?  The following factors may make you more likely to develop this condition:  · Having allergies or asthma.  · Having had a recent cold or respiratory tract infection.  · Having structural deformities or blockages in your nose or sinuses.  · Having a weak immune system.  · Doing a lot of swimming or diving.  · Overusing nasal sprays.  · Smoking.    What are the signs or symptoms?  The main symptoms of this condition are pain and a feeling of pressure around the affected sinuses. Other symptoms include:  · Upper toothache.  · Earache.  · Headache.  · Bad breath.  · Decreased sense of smell and taste.  · A cough that may get worse at night.  · Fatigue.  · Fever.  · Thick drainage from your nose. The drainage is often green and it may contain pus (purulent).  · Stuffy nose or congestion.  · Postnasal drip. This is when extra mucus collects in the throat or  back of the nose.  · Swelling and warmth over the affected sinuses.  · Sore throat.  · Sensitivity to light.    How is this diagnosed?  This condition is diagnosed based on symptoms, a medical history, and a physical exam. To find out if your condition is acute or chronic, your health care provider may:  · Look in your nose for signs of nasal polyps.  · Tap over the affected sinus to check for signs of infection.  · View the inside of your sinuses using an imaging device that has a light attached (endoscope).    If your health care provider suspects that you have chronic sinusitis, you may also:  · Be tested for allergies.  · Have a sample of mucus taken from your nose (nasal culture) and checked for bacteria.  · Have a mucus sample examined to see if your sinusitis is related to an allergy.    If your sinusitis does not respond to treatment and it lasts longer than 8 weeks, you may have an MRI or CT scan to check your sinuses. These scans also help to determine how severe your infection is.  In rare cases, a bone biopsy may be done to rule out more serious types of fungal sinus disease.  How is this treated?  Treatment for sinusitis depends on the cause and whether your condition is chronic or acute. If a virus is causing your sinusitis, your symptoms will go away on their own within 10 days. You may be given medicines to relieve your symptoms, including:  · Topical nasal decongestants. They shrink swollen nasal passages and let mucus drain from your sinuses.  · Antihistamines. These drugs block inflammation that is triggered by allergies. This can help to ease swelling in your nose and sinuses.  · Topical nasal corticosteroids. These are nasal sprays that ease inflammation and swelling in your nose and sinuses.  · Nasal saline washes. These rinses can help to get rid of thick mucus in your nose.    If your condition is caused by bacteria, you will be given an antibiotic medicine. If your condition is caused by a  fungus, you will be given an antifungal medicine.  Surgery may be needed to correct underlying conditions, such as narrow nasal passages. Surgery may also be needed to remove polyps.  Follow these instructions at home:  Medicines  · Take, use, or apply over-the-counter and prescription medicines only as told by your health care provider. These may include nasal sprays.  · If you were prescribed an antibiotic medicine, take it as told by your health care provider. Do not stop taking the antibiotic even if you start to feel better.  Hydrate and Humidify  · Drink enough water to keep your urine clear or pale yellow. Staying hydrated will help to thin your mucus.  · Use a cool mist humidifier to keep the humidity level in your home above 50%.  · Inhale steam for 10-15 minutes, 3-4 times a day or as told by your health care provider. You can do this in the bathroom while a hot shower is running.  · Limit your exposure to cool or dry air.  Rest  · Rest as much as possible.  · Sleep with your head raised (elevated).  · Make sure to get enough sleep each night.  General instructions  · Apply a warm, moist washcloth to your face 3-4 times a day or as told by your health care provider. This will help with discomfort.  · Wash your hands often with soap and water to reduce your exposure to viruses and other germs. If soap and water are not available, use hand .  · Do not smoke. Avoid being around people who are smoking (secondhand smoke).  · Keep all follow-up visits as told by your health care provider. This is important.  Contact a health care provider if:  · You have a fever.  · Your symptoms get worse.  · Your symptoms do not improve within 10 days.  Get help right away if:  · You have a severe headache.  · You have persistent vomiting.  · You have pain or swelling around your face or eyes.  · You have vision problems.  · You develop confusion.  · Your neck is stiff.  · You have trouble breathing.  This information  is not intended to replace advice given to you by your health care provider. Make sure you discuss any questions you have with your health care provider.  Document Released: 12/18/2006 Document Revised: 08/13/2017 Document Reviewed: 10/12/2016  [employer] Interactive Patient Education © 2018 [employer]      Return in about 1 month (around 2/23/2019), or if symptoms worsen or fail to improve, for Recheck diabetes.      Nilda Nayak MD  01/23/19

## 2019-01-23 NOTE — PATIENT INSTRUCTIONS
Stay home from work tomorrow    Sinusitis, Adult  Sinusitis is soreness and inflammation of your sinuses. Sinuses are hollow spaces in the bones around your face. Your sinuses are located:  · Around your eyes.  · In the middle of your forehead.  · Behind your nose.  · In your cheekbones.    Your sinuses and nasal passages are lined with a stringy fluid (mucus). Mucus normally drains out of your sinuses. When your nasal tissues become inflamed or swollen, the mucus can become trapped or blocked so air cannot flow through your sinuses. This allows bacteria, viruses, and funguses to grow, which leads to infection.  Sinusitis can develop quickly and last for 7?10 days (acute) or for more than 12 weeks (chronic). Sinusitis often develops after a cold.  What are the causes?  This condition is caused by anything that creates swelling in the sinuses or stops mucus from draining, including:  · Allergies.  · Asthma.  · Bacterial or viral infection.  · Abnormally shaped bones between the nasal passages.  · Nasal growths that contain mucus (nasal polyps).  · Narrow sinus openings.  · Pollutants, such as chemicals or irritants in the air.  · A foreign object stuck in the nose.  · A fungal infection. This is rare.    What increases the risk?  The following factors may make you more likely to develop this condition:  · Having allergies or asthma.  · Having had a recent cold or respiratory tract infection.  · Having structural deformities or blockages in your nose or sinuses.  · Having a weak immune system.  · Doing a lot of swimming or diving.  · Overusing nasal sprays.  · Smoking.    What are the signs or symptoms?  The main symptoms of this condition are pain and a feeling of pressure around the affected sinuses. Other symptoms include:  · Upper toothache.  · Earache.  · Headache.  · Bad breath.  · Decreased sense of smell and taste.  · A cough that may get worse at night.  · Fatigue.  · Fever.  · Thick drainage from your nose.  The drainage is often green and it may contain pus (purulent).  · Stuffy nose or congestion.  · Postnasal drip. This is when extra mucus collects in the throat or back of the nose.  · Swelling and warmth over the affected sinuses.  · Sore throat.  · Sensitivity to light.    How is this diagnosed?  This condition is diagnosed based on symptoms, a medical history, and a physical exam. To find out if your condition is acute or chronic, your health care provider may:  · Look in your nose for signs of nasal polyps.  · Tap over the affected sinus to check for signs of infection.  · View the inside of your sinuses using an imaging device that has a light attached (endoscope).    If your health care provider suspects that you have chronic sinusitis, you may also:  · Be tested for allergies.  · Have a sample of mucus taken from your nose (nasal culture) and checked for bacteria.  · Have a mucus sample examined to see if your sinusitis is related to an allergy.    If your sinusitis does not respond to treatment and it lasts longer than 8 weeks, you may have an MRI or CT scan to check your sinuses. These scans also help to determine how severe your infection is.  In rare cases, a bone biopsy may be done to rule out more serious types of fungal sinus disease.  How is this treated?  Treatment for sinusitis depends on the cause and whether your condition is chronic or acute. If a virus is causing your sinusitis, your symptoms will go away on their own within 10 days. You may be given medicines to relieve your symptoms, including:  · Topical nasal decongestants. They shrink swollen nasal passages and let mucus drain from your sinuses.  · Antihistamines. These drugs block inflammation that is triggered by allergies. This can help to ease swelling in your nose and sinuses.  · Topical nasal corticosteroids. These are nasal sprays that ease inflammation and swelling in your nose and sinuses.  · Nasal saline washes. These rinses can  help to get rid of thick mucus in your nose.    If your condition is caused by bacteria, you will be given an antibiotic medicine. If your condition is caused by a fungus, you will be given an antifungal medicine.  Surgery may be needed to correct underlying conditions, such as narrow nasal passages. Surgery may also be needed to remove polyps.  Follow these instructions at home:  Medicines  · Take, use, or apply over-the-counter and prescription medicines only as told by your health care provider. These may include nasal sprays.  · If you were prescribed an antibiotic medicine, take it as told by your health care provider. Do not stop taking the antibiotic even if you start to feel better.  Hydrate and Humidify  · Drink enough water to keep your urine clear or pale yellow. Staying hydrated will help to thin your mucus.  · Use a cool mist humidifier to keep the humidity level in your home above 50%.  · Inhale steam for 10-15 minutes, 3-4 times a day or as told by your health care provider. You can do this in the bathroom while a hot shower is running.  · Limit your exposure to cool or dry air.  Rest  · Rest as much as possible.  · Sleep with your head raised (elevated).  · Make sure to get enough sleep each night.  General instructions  · Apply a warm, moist washcloth to your face 3-4 times a day or as told by your health care provider. This will help with discomfort.  · Wash your hands often with soap and water to reduce your exposure to viruses and other germs. If soap and water are not available, use hand .  · Do not smoke. Avoid being around people who are smoking (secondhand smoke).  · Keep all follow-up visits as told by your health care provider. This is important.  Contact a health care provider if:  · You have a fever.  · Your symptoms get worse.  · Your symptoms do not improve within 10 days.  Get help right away if:  · You have a severe headache.  · You have persistent vomiting.  · You have pain  or swelling around your face or eyes.  · You have vision problems.  · You develop confusion.  · Your neck is stiff.  · You have trouble breathing.  This information is not intended to replace advice given to you by your health care provider. Make sure you discuss any questions you have with your health care provider.  Document Released: 12/18/2006 Document Revised: 08/13/2017 Document Reviewed: 10/12/2016  [employer] Interactive Patient Education © 2018 [employer]

## 2019-02-25 ENCOUNTER — OFFICE VISIT (OUTPATIENT)
Dept: FAMILY MEDICINE CLINIC | Facility: CLINIC | Age: 59
End: 2019-02-25

## 2019-02-25 VITALS
HEART RATE: 71 BPM | BODY MASS INDEX: 41.59 KG/M2 | DIASTOLIC BLOOD PRESSURE: 88 MMHG | OXYGEN SATURATION: 98 % | SYSTOLIC BLOOD PRESSURE: 122 MMHG | WEIGHT: 265 LBS | HEIGHT: 67 IN | RESPIRATION RATE: 13 BRPM

## 2019-02-25 DIAGNOSIS — R53.82 CHRONIC FATIGUE: ICD-10-CM

## 2019-02-25 DIAGNOSIS — Z13.220 SCREENING FOR HYPERLIPIDEMIA: ICD-10-CM

## 2019-02-25 DIAGNOSIS — I10 ESSENTIAL HYPERTENSION: ICD-10-CM

## 2019-02-25 DIAGNOSIS — R79.89 ELEVATED LFTS: ICD-10-CM

## 2019-02-25 DIAGNOSIS — F41.9 ANXIETY: ICD-10-CM

## 2019-02-25 DIAGNOSIS — E11.42 TYPE 2 DIABETES MELLITUS WITH DIABETIC POLYNEUROPATHY, WITHOUT LONG-TERM CURRENT USE OF INSULIN (HCC): Primary | ICD-10-CM

## 2019-02-25 LAB
ERYTHROCYTE [DISTWIDTH] IN BLOOD BY AUTOMATED COUNT: 13.9 % (ref 12.3–15.4)
HBA1C MFR BLD: 6.2 %
HCT VFR BLD AUTO: 42.9 % (ref 34–46.6)
HGB BLD-MCNC: 13.3 G/DL (ref 12–15.9)
MCH RBC QN AUTO: 28.2 PG (ref 26.6–33)
MCHC RBC AUTO-ENTMCNC: 31 G/DL (ref 31.5–35.7)
MCV RBC AUTO: 91.1 FL (ref 79–97)
PLATELET # BLD AUTO: 308 10*3/MM3 (ref 140–450)
RBC # BLD AUTO: 4.71 10*6/MM3 (ref 3.77–5.28)
WBC # BLD AUTO: 8.6 10*3/MM3 (ref 3.4–10.8)

## 2019-02-25 PROCEDURE — 83036 HEMOGLOBIN GLYCOSYLATED A1C: CPT | Performed by: FAMILY MEDICINE

## 2019-02-25 PROCEDURE — 99214 OFFICE O/P EST MOD 30 MIN: CPT | Performed by: FAMILY MEDICINE

## 2019-02-25 RX ORDER — LISINOPRIL 10 MG/1
10 TABLET ORAL DAILY
Qty: 90 TABLET | Refills: 1 | Status: SHIPPED | OUTPATIENT
Start: 2019-02-25 | End: 2019-09-30 | Stop reason: ALTCHOICE

## 2019-02-25 RX ORDER — DESVENLAFAXINE SUCCINATE 50 MG/1
50 TABLET, EXTENDED RELEASE ORAL DAILY
Qty: 90 TABLET | Refills: 1 | Status: SHIPPED | OUTPATIENT
Start: 2019-02-25 | End: 2019-09-25 | Stop reason: SDUPTHER

## 2019-02-25 RX ORDER — METFORMIN HYDROCHLORIDE 500 MG/1
500 TABLET, FILM COATED, EXTENDED RELEASE ORAL 2 TIMES DAILY
Qty: 180 TABLET | Refills: 1 | Status: SHIPPED | OUTPATIENT
Start: 2019-02-25 | End: 2019-10-01 | Stop reason: DRUGHIGH

## 2019-02-25 NOTE — PROGRESS NOTES
Subjective   Jemal Serrano is a 58 y.o. female.     No chief complaint on file.      HPI        Review of Systems    Past Medical History:   Diagnosis Date   • Aortic aneurysm (CMS/HCC)     followed by [facility]   • Depression    • Diabetes mellitus (CMS/HCC)    • Fatty liver    • Hypertension    • Melanoma (CMS/HCC)     s/p surgical removal, followed by [facility]   • Pyelonephritis    • Sleep apnea     compliant with CPAP   • Visual impairment     followed by [facility]       Past Surgical History:   Procedure Laterality Date   •  SECTION      x 2   • HYSTERECTOMY      Complete hysterectomy due to a large ovarian cyst and menorrhagia   • LUMBAR DISC SURGERY     • UMBILICAL HERNIA REPAIR         Family History   Problem Relation Age of Onset   • Irregular heart beat Mother    • Hypertension Father    • Breast cancer Sister    • Irregular heart beat Sister    • Leukemia Maternal Grandmother    • Heart attack Paternal Grandfather        Social History     Tobacco Use   • Smoking status: Never Smoker   • Smokeless tobacco: Never Used   Substance Use Topics   • Alcohol use: No   • Drug use: Not on file     Social History     Social History Narrative   • Not on file       Allergies   Allergen Reactions   • Adhesive Tape Other (See Comments)     Redness when left on a while CAN USE PAPER TAPE   • Codeine Nausea And Vomiting   • Latex Rash   • Sulfa Antibiotics Itching        Outpatient Medications Prior to Visit   Medication Sig Dispense Refill   • desvenlafaxine (PRISTIQ) 50 MG 24 hr tablet Take 50 mg by mouth.     • lisinopril (PRINIVIL,ZESTRIL) 10 MG tablet Take 1 tablet by mouth Daily. 30 tablet 1   • metFORMIN (GLUMETZA) 500 MG (MOD) 24 hr tablet Take 500 mg by mouth 2 (Two) Times a Day.     • azithromycin (ZITHROMAX Z-ALIDA) 250 MG tablet Take 2 tablets the first day, then 1 tablet daily for 4 days. 6 tablet 0     No facility-administered medications prior to visit.        Objective      Vitals:    02/25/19 0827   BP: 122/88   Pulse: 71   Resp: 13   SpO2: 98%       Physical Exam    ASSESSMENT/PLAN       Problem List Items Addressed This Visit     None            There are no Patient Instructions on file for this visit.  No Follow-up on file.      Molly Gee MA  02/25/19

## 2019-02-25 NOTE — PATIENT INSTRUCTIONS
Try to incorporate 15-30 minutes of exercise per day.     If you have feelings of low blood sugar (confusion, sweating, dizziness), please call the office.     Diabetes Mellitus and Nutrition  When you have diabetes (diabetes mellitus), it is very important to have healthy eating habits because your blood sugar (glucose) levels are greatly affected by what you eat and drink. Eating healthy foods in the appropriate amounts, at about the same times every day, can help you:  · Control your blood glucose.  · Lower your risk of heart disease.  · Improve your blood pressure.  · Reach or maintain a healthy weight.    Every person with diabetes is different, and each person has different needs for a meal plan. Your health care provider may recommend that you work with a diet and nutrition specialist (dietitian) to make a meal plan that is best for you. Your meal plan may vary depending on factors such as:  · The calories you need.  · The medicines you take.  · Your weight.  · Your blood glucose, blood pressure, and cholesterol levels.  · Your activity level.  · Other health conditions you have, such as heart or kidney disease.    How do carbohydrates affect me?  Carbohydrates affect your blood glucose level more than any other type of food. Eating carbohydrates naturally increases the amount of glucose in your blood. Carbohydrate counting is a method for keeping track of how many carbohydrates you eat. Counting carbohydrates is important to keep your blood glucose at a healthy level, especially if you use insulin or take certain oral diabetes medicines.  It is important to know how many carbohydrates you can safely have in each meal. This is different for every person. Your dietitian can help you calculate how many carbohydrates you should have at each meal and for snack.  Foods that contain carbohydrates include:  · Bread, cereal, rice, pasta, and crackers.  · Potatoes and corn.  · Peas, beans, and lentils.  · Milk and  "yogurt.  · Fruit and juice.  · Desserts, such as cakes, cookies, ice cream, and candy.    How does alcohol affect me?  Alcohol can cause a sudden decrease in blood glucose (hypoglycemia), especially if you use insulin or take certain oral diabetes medicines. Hypoglycemia can be a life-threatening condition. Symptoms of hypoglycemia (sleepiness, dizziness, and confusion) are similar to symptoms of having too much alcohol.  If your health care provider says that alcohol is safe for you, follow these guidelines:  · Limit alcohol intake to no more than 1 drink per day for nonpregnant women and 2 drinks per day for men. One drink equals 12 oz of beer, 5 oz of wine, or 1½ oz of hard liquor.  · Do not drink on an empty stomach.  · Keep yourself hydrated with water, diet soda, or unsweetened iced tea.  · Keep in mind that regular soda, juice, and other mixers may contain a lot of sugar and must be counted as carbohydrates.    What are tips for following this plan?  Reading food labels  · Start by checking the serving size on the label. The amount of calories, carbohydrates, fats, and other nutrients listed on the label are based on one serving of the food. Many foods contain more than one serving per package.  · Check the total grams (g) of carbohydrates in one serving. You can calculate the number of servings of carbohydrates in one serving by dividing the total carbohydrates by 15. For example, if a food has 30 g of total carbohydrates, it would be equal to 2 servings of carbohydrates.  · Check the number of grams (g) of saturated and trans fats in one serving. Choose foods that have low or no amount of these fats.  · Check the number of milligrams (mg) of sodium in one serving. Most people should limit total sodium intake to less than 2,300 mg per day.  · Always check the nutrition information of foods labeled as \"low-fat\" or \"nonfat\". These foods may be higher in added sugar or refined carbohydrates and should be " avoided.  · Talk to your dietitian to identify your daily goals for nutrients listed on the label.  Shopping  · Avoid buying canned, premade, or processed foods. These foods tend to be high in fat, sodium, and added sugar.  · Shop around the outside edge of the grocery store. This includes fresh fruits and vegetables, bulk grains, fresh meats, and fresh dairy.  Cooking  · Use low-heat cooking methods, such as baking, instead of high-heat cooking methods like deep frying.  · Cook using healthy oils, such as olive, canola, or sunflower oil.  · Avoid cooking with butter, cream, or high-fat meats.  Meal planning  · Eat meals and snacks regularly, preferably at the same times every day. Avoid going long periods of time without eating.  · Eat foods high in fiber, such as fresh fruits, vegetables, beans, and whole grains. Talk to your dietitian about how many servings of carbohydrates you can eat at each meal.  · Eat 4-6 ounces of lean protein each day, such as lean meat, chicken, fish, eggs, or tofu. 1 ounce is equal to 1 ounce of meat, chicken, or fish, 1 egg, or 1/4 cup of tofu.  · Eat some foods each day that contain healthy fats, such as avocado, nuts, seeds, and fish.  Lifestyle    · Check your blood glucose regularly.  · Exercise at least 30 minutes 5 or more days each week, or as told by your health care provider.  · Take medicines as told by your health care provider.  · Do not use any products that contain nicotine or tobacco, such as cigarettes and e-cigarettes. If you need help quitting, ask your health care provider.  · Work with a counselor or diabetes educator to identify strategies to manage stress and any emotional and social challenges.  What are some questions to ask my health care provider?  · Do I need to meet with a diabetes educator?  · Do I need to meet with a dietitian?  · What number can I call if I have questions?  · When are the best times to check my blood glucose?  Where to find more  information:  · American Diabetes Association: diabetes.org/food-and-fitness/food  · Academy of Nutrition and Dietetics: www.eatright.org/resources/health/diseases-and-conditions/diabetes  · National [place] of Diabetes and Digestive and Kidney Diseases (NIH): www.niddk.nih.gov/health-information/diabetes/overview/diet-eating-physical-activity  Summary  · A healthy meal plan will help you control your blood glucose and maintain a healthy lifestyle.  · Working with a diet and nutrition specialist (dietitian) can help you make a meal plan that is best for you.  · Keep in mind that carbohydrates and alcohol have immediate effects on your blood glucose levels. It is important to count carbohydrates and to use alcohol carefully.  This information is not intended to replace advice given to you by your health care provider. Make sure you discuss any questions you have with your health care provider.  Document Released: 09/14/2006 Document Revised: 01/22/2018 Document Reviewed: 01/22/2018  "[employer] Interactive Patient Education © 2018 [employer]

## 2019-02-25 NOTE — PROGRESS NOTES
Subjective   Jemal Serrano is a 58 y.o. female.     Chief Complaint   Patient presents with   • Diabetes   • Hypertension   • Med Refill     pristiq       HPI     HTN:   Managed with lisinopril 10mg/daily. Her BP is 122/88 today. She feels like this medication is working well for her with no side effects. No chest pain, dizziness, headaches, or acute vision changes. She has not been exercising.  Patient is fasting today for follow up labs.      Diabetes:  Managed with metformin 500mg/BID. Her last A1C was 8.4 in October 2018. No diarrhea, dizziness, diaphoresis, or confusion.  No wounds. Last eye exam was in summer 2018.     Depression:  Managed with desvenlafaxine 50mg/daily. Patient states that this medication is working well for her. No SI or HI. She has never been hospitalized due to mental illness.     Patient states that she has been fatigued lately. She is still using her CPAP every night. She is followed by pulmonary (Dr. Chang) for Sleep Apnea.       Pt reports a hx of elevated LFTs with negative screening for viral hepatitis in the past.  She does not however think she has been tested for autoimmune hepatitis and she would like to be worked up for this possibility.    Pt recently had a follow up appt w/her Cardiologist, Dr. Rodas.  She reports that her aortic aneurysm is stable.      Review of Systems   Constitutional: Positive for fatigue. Negative for activity change, appetite change and unexpected weight change.   HENT: Negative for congestion, sinus pain and tinnitus.    Eyes: Negative for pain and visual disturbance.   Respiratory: Negative for cough, chest tightness, shortness of breath and wheezing.    Cardiovascular: Negative for chest pain and palpitations.   Gastrointestinal: Negative for abdominal pain, diarrhea, nausea and vomiting.   Musculoskeletal: Negative for arthralgias, back pain and myalgias.   Skin: Negative for rash.   Allergic/Immunologic: Negative for environmental allergies  and food allergies.   Neurological: Negative for dizziness, weakness and headaches.   Psychiatric/Behavioral: Negative for sleep disturbance. The patient is not nervous/anxious.        Past Medical History:   Diagnosis Date   • Aortic aneurysm (CMS/HCC)     followed by [facility], Dr. Rodas   • Depression    • Diabetes mellitus (CMS/HCC)    • Fatty liver    • Hypertension    • Melanoma (CMS/HCC)     s/p surgical removal, followed by [facility]   • Pyelonephritis    • Sleep apnea     compliant with CPAP   • Visual impairment     followed by [facility]       Past Surgical History:   Procedure Laterality Date   •  SECTION      x 2   • HYSTERECTOMY      Complete hysterectomy due to a large ovarian cyst and menorrhagia   • LUMBAR DISC SURGERY     • UMBILICAL HERNIA REPAIR         Family History   Problem Relation Age of Onset   • Irregular heart beat Mother    • Hypertension Father    • Breast cancer Sister    • Irregular heart beat Sister    • Leukemia Maternal Grandmother    • Heart attack Paternal Grandfather        Social History     Tobacco Use   • Smoking status: Never Smoker   • Smokeless tobacco: Never Used   Substance Use Topics   • Alcohol use: No   • Drug use: No         Allergies   Allergen Reactions   • Adhesive Tape Other (See Comments)     Redness when left on a while CAN USE PAPER TAPE   • Codeine Nausea And Vomiting   • Latex Rash   • Sulfa Antibiotics Itching        Outpatient Medications Prior to Visit   Medication Sig Dispense Refill   • desvenlafaxine (PRISTIQ) 50 MG 24 hr tablet Take 50 mg by mouth.     • lisinopril (PRINIVIL,ZESTRIL) 10 MG tablet Take 1 tablet by mouth Daily. 30 tablet 1   • metFORMIN (GLUMETZA) 500 MG (MOD) 24 hr tablet Take 500 mg by mouth 2 (Two) Times a Day.         Objective     Vitals:    19 0827   BP: 122/88   Pulse: 71   Resp: 13   SpO2: 98%     BMI 41.5    Physical Exam   Constitutional: She appears well-developed and well-nourished.    HENT:   Head: Normocephalic and atraumatic.   Eyes: EOM are normal. Pupils are equal, round, and reactive to light.   Neck: Normal range of motion. Neck supple.   Cardiovascular: Normal rate and regular rhythm. Exam reveals no gallop and no friction rub.   No murmur heard.  Pulmonary/Chest: Effort normal and breath sounds normal. No respiratory distress. She has no wheezes. She has no rales.   Musculoskeletal: Normal range of motion.   Neurological: She has normal strength. Gait normal.   Skin: Skin is warm and dry.   Psychiatric: She has a normal mood and affect.   Vitals reviewed.      ASSESSMENT/PLAN       Problem List Items Addressed This Visit     None      Visit Diagnoses     Type 2 diabetes mellitus with diabetic polyneuropathy, without long-term current use of insulin (CMS/[facility])    -  Primary  Well controlled with POC Glycosylated Hemoglobin (Hb A1C)-6.2% todayt    Relevant Medications    Refill metFORMIN (GLUMETZA) 500 MG (MOD) 24 hr tablet BID    Other Relevant Orders        Comprehensive metabolic panel    CBC No Differential    Essential hypertension      well controlled, continue current medication regimen    Relevant Medications    lisinopril (PRINIVIL,ZESTRIL) 10 MG tablet    Anxiety      well controlled, continue current medication regimen    Relevant Medications    desvenlafaxine (PRISTIQ) 50 MG 24 hr tablet    Other Relevant Orders    CBC No Differential    TSH    Elevated LFTs        Relevant Orders    Comprehensive metabolic panel    ARELY    Anti-Smooth Muscle Antibody Titer    Chronic fatigue        Relevant Orders    CBC No Differential    TSH    Screening for hyperlipidemia        Relevant Orders    Lipid panel        Cardiology records requested    Patient Instructions   Try to incorporate 15-30 minutes of exercise per day.     If you have feelings of low blood sugar (confusion, sweating, dizziness), please call the office.     Diabetes Mellitus and Nutrition  When you have diabetes (diabetes  mellitus), it is very important to have healthy eating habits because your blood sugar (glucose) levels are greatly affected by what you eat and drink. Eating healthy foods in the appropriate amounts, at about the same times every day, can help you:  · Control your blood glucose.  · Lower your risk of heart disease.  · Improve your blood pressure.  · Reach or maintain a healthy weight.    Every person with diabetes is different, and each person has different needs for a meal plan. Your health care provider may recommend that you work with a diet and nutrition specialist (dietitian) to make a meal plan that is best for you. Your meal plan may vary depending on factors such as:  · The calories you need.  · The medicines you take.  · Your weight.  · Your blood glucose, blood pressure, and cholesterol levels.  · Your activity level.  · Other health conditions you have, such as heart or kidney disease.    How do carbohydrates affect me?  Carbohydrates affect your blood glucose level more than any other type of food. Eating carbohydrates naturally increases the amount of glucose in your blood. Carbohydrate counting is a method for keeping track of how many carbohydrates you eat. Counting carbohydrates is important to keep your blood glucose at a healthy level, especially if you use insulin or take certain oral diabetes medicines.  It is important to know how many carbohydrates you can safely have in each meal. This is different for every person. Your dietitian can help you calculate how many carbohydrates you should have at each meal and for snack.  Foods that contain carbohydrates include:  · Bread, cereal, rice, pasta, and crackers.  · Potatoes and corn.  · Peas, beans, and lentils.  · Milk and yogurt.  · Fruit and juice.  · Desserts, such as cakes, cookies, ice cream, and candy.    How does alcohol affect me?  Alcohol can cause a sudden decrease in blood glucose (hypoglycemia), especially if you use insulin or take  "certain oral diabetes medicines. Hypoglycemia can be a life-threatening condition. Symptoms of hypoglycemia (sleepiness, dizziness, and confusion) are similar to symptoms of having too much alcohol.  If your health care provider says that alcohol is safe for you, follow these guidelines:  · Limit alcohol intake to no more than 1 drink per day for nonpregnant women and 2 drinks per day for men. One drink equals 12 oz of beer, 5 oz of wine, or 1½ oz of hard liquor.  · Do not drink on an empty stomach.  · Keep yourself hydrated with water, diet soda, or unsweetened iced tea.  · Keep in mind that regular soda, juice, and other mixers may contain a lot of sugar and must be counted as carbohydrates.    What are tips for following this plan?  Reading food labels  · Start by checking the serving size on the label. The amount of calories, carbohydrates, fats, and other nutrients listed on the label are based on one serving of the food. Many foods contain more than one serving per package.  · Check the total grams (g) of carbohydrates in one serving. You can calculate the number of servings of carbohydrates in one serving by dividing the total carbohydrates by 15. For example, if a food has 30 g of total carbohydrates, it would be equal to 2 servings of carbohydrates.  · Check the number of grams (g) of saturated and trans fats in one serving. Choose foods that have low or no amount of these fats.  · Check the number of milligrams (mg) of sodium in one serving. Most people should limit total sodium intake to less than 2,300 mg per day.  · Always check the nutrition information of foods labeled as \"low-fat\" or \"nonfat\". These foods may be higher in added sugar or refined carbohydrates and should be avoided.  · Talk to your dietitian to identify your daily goals for nutrients listed on the label.  Shopping  · Avoid buying canned, premade, or processed foods. These foods tend to be high in fat, sodium, and added sugar.  · Shop " around the outside edge of the grocery store. This includes fresh fruits and vegetables, bulk grains, fresh meats, and fresh dairy.  Cooking  · Use low-heat cooking methods, such as baking, instead of high-heat cooking methods like deep frying.  · Cook using healthy oils, such as olive, canola, or sunflower oil.  · Avoid cooking with butter, cream, or high-fat meats.  Meal planning  · Eat meals and snacks regularly, preferably at the same times every day. Avoid going long periods of time without eating.  · Eat foods high in fiber, such as fresh fruits, vegetables, beans, and whole grains. Talk to your dietitian about how many servings of carbohydrates you can eat at each meal.  · Eat 4-6 ounces of lean protein each day, such as lean meat, chicken, fish, eggs, or tofu. 1 ounce is equal to 1 ounce of meat, chicken, or fish, 1 egg, or 1/4 cup of tofu.  · Eat some foods each day that contain healthy fats, such as avocado, nuts, seeds, and fish.  Lifestyle    · Check your blood glucose regularly.  · Exercise at least 30 minutes 5 or more days each week, or as told by your health care provider.  · Take medicines as told by your health care provider.  · Do not use any products that contain nicotine or tobacco, such as cigarettes and e-cigarettes. If you need help quitting, ask your health care provider.  · Work with a counselor or diabetes educator to identify strategies to manage stress and any emotional and social challenges.  What are some questions to ask my health care provider?  · Do I need to meet with a diabetes educator?  · Do I need to meet with a dietitian?  · What number can I call if I have questions?  · When are the best times to check my blood glucose?  Where to find more information:  · American Diabetes Association: diabetes.org/food-and-fitness/food  · Academy of Nutrition and Dietetics: www.eatright.org/resources/health/diseases-and-conditions/diabetes  · National [place] of Diabetes and Digestive and  Kidney Diseases (NIH): www.niddk.nih.gov/health-information/diabetes/overview/diet-eating-physical-activity  Summary  · A healthy meal plan will help you control your blood glucose and maintain a healthy lifestyle.  · Working with a diet and nutrition specialist (dietitian) can help you make a meal plan that is best for you.  · Keep in mind that carbohydrates and alcohol have immediate effects on your blood glucose levels. It is important to count carbohydrates and to use alcohol carefully.  This information is not intended to replace advice given to you by your health care provider. Make sure you discuss any questions you have with your health care provider.  Document Released: 09/14/2006 Document Revised: 01/22/2018 Document Reviewed: 01/22/2018  [employer] Patient Education © 2018 [employer]        Return in about 6 months (around 8/25/2019) for Annual.       IRosa, am scribing for, and in the presence of,Nilda Nayak MD. 2/25/2019 9:02 AM    I, Nilda Nayak MD   personally, performed the services described in this documentation, as scribed by,Rosa Solis, in my presence, and it is both accurate and complete.    Nilda Nayak MD  02/25/19  9:10 AM

## 2019-02-26 LAB
ACTIN IGG SERPL-ACNC: 6 UNITS (ref 0–19)
ALBUMIN SERPL-MCNC: 4.1 G/DL (ref 3.5–5.2)
ALBUMIN/GLOB SERPL: 1.1 G/DL
ALP SERPL-CCNC: 123 U/L (ref 39–117)
ALT SERPL-CCNC: 33 U/L (ref 1–33)
ANA SER QL: NEGATIVE
AST SERPL-CCNC: 27 U/L (ref 1–32)
BILIRUB SERPL-MCNC: 0.4 MG/DL (ref 0.1–1.2)
BUN SERPL-MCNC: 8 MG/DL (ref 6–20)
BUN/CREAT SERPL: 12.3 (ref 7–25)
CALCIUM SERPL-MCNC: 10 MG/DL (ref 8.6–10.5)
CHLORIDE SERPL-SCNC: 101 MMOL/L (ref 98–107)
CHOLEST SERPL-MCNC: 168 MG/DL (ref 0–200)
CO2 SERPL-SCNC: 27 MMOL/L (ref 22–29)
CREAT SERPL-MCNC: 0.65 MG/DL (ref 0.57–1)
GLOBULIN SER CALC-MCNC: 3.6 GM/DL
GLUCOSE SERPL-MCNC: 243 MG/DL (ref 65–99)
HDLC SERPL-MCNC: 44 MG/DL (ref 40–60)
LDLC SERPL CALC-MCNC: 103 MG/DL (ref 0–100)
POTASSIUM SERPL-SCNC: 4.3 MMOL/L (ref 3.5–5.2)
PROT SERPL-MCNC: 7.7 G/DL (ref 6–8.5)
SODIUM SERPL-SCNC: 142 MMOL/L (ref 136–145)
TRIGL SERPL-MCNC: 103 MG/DL (ref 0–150)
TSH SERPL DL<=0.005 MIU/L-ACNC: 1.33 MIU/ML (ref 0.27–4.2)
VLDLC SERPL CALC-MCNC: 20.6 MG/DL (ref 5–40)

## 2019-03-12 ENCOUNTER — TELEPHONE (OUTPATIENT)
Dept: FAMILY MEDICINE CLINIC | Facility: CLINIC | Age: 59
End: 2019-03-12

## 2019-03-12 DIAGNOSIS — R74.8 ELEVATED LIVER ENZYMES: Primary | ICD-10-CM

## 2019-03-12 NOTE — TELEPHONE ENCOUNTER
Called and spoke with patient to let her know lab results and asked to return for repeat labs.     ----- Message from Nilda Nayak MD sent at 3/10/2019  9:55 PM EDT -----  Please contact pt and let her know that cholesterol is running high. I recommend that she start taking Atorvastatin 20 mg tablets each evening before bed (please call in Rx if pt agrees)  If pt develops muscle pain while taking this medication, she should stop taking the medication and call the clinic at [phone].    Her alkaline phosphatase level (an enzyme found in both liver and bones) was slightly elevated.  Please ask her to schedule fasting follow up lab (fractionated alk phos).   Testing for autoimmune hepatitis was negative (normal).

## 2019-03-26 ENCOUNTER — RESULTS ENCOUNTER (OUTPATIENT)
Dept: FAMILY MEDICINE CLINIC | Facility: CLINIC | Age: 59
End: 2019-03-26

## 2019-03-26 DIAGNOSIS — R74.8 ELEVATED LIVER ENZYMES: ICD-10-CM

## 2019-04-04 LAB
ALP BONE CFR SERPL: 36 % (ref 14–68)
ALP INTEST CFR SERPL: 15 % (ref 0–18)
ALP LIVER CFR SERPL: 49 % (ref 18–85)
ALP SERPL-CCNC: 124 IU/L (ref 39–117)

## 2019-04-05 DIAGNOSIS — R74.8 ELEVATED ALKALINE PHOSPHATASE LEVEL: Primary | ICD-10-CM

## 2019-04-12 RX ORDER — FLUCONAZOLE 150 MG/1
150 TABLET ORAL ONCE
Qty: 1 TABLET | Refills: 0 | Status: SHIPPED | OUTPATIENT
Start: 2019-04-12 | End: 2019-04-12

## 2019-04-16 ENCOUNTER — OFFICE VISIT (OUTPATIENT)
Dept: FAMILY MEDICINE CLINIC | Facility: CLINIC | Age: 59
End: 2019-04-16

## 2019-04-16 VITALS
BODY MASS INDEX: 40.81 KG/M2 | DIASTOLIC BLOOD PRESSURE: 82 MMHG | RESPIRATION RATE: 13 BRPM | HEART RATE: 83 BPM | SYSTOLIC BLOOD PRESSURE: 120 MMHG | WEIGHT: 260 LBS | OXYGEN SATURATION: 98 % | HEIGHT: 67 IN

## 2019-04-16 DIAGNOSIS — Z09 HOSPITAL DISCHARGE FOLLOW-UP: Primary | ICD-10-CM

## 2019-04-16 DIAGNOSIS — Z11.59 NEED FOR HEPATITIS C SCREENING TEST: ICD-10-CM

## 2019-04-16 DIAGNOSIS — R74.8 ELEVATED ALKALINE PHOSPHATASE LEVEL: ICD-10-CM

## 2019-04-16 DIAGNOSIS — B37.31 CANDIDIASIS OF VULVA: ICD-10-CM

## 2019-04-16 DIAGNOSIS — K21.9 GASTROESOPHAGEAL REFLUX DISEASE, ESOPHAGITIS PRESENCE NOT SPECIFIED: ICD-10-CM

## 2019-04-16 LAB
25(OH)D3+25(OH)D2 SERPL-MCNC: 12.5 NG/ML (ref 30–100)
ALBUMIN SERPL-MCNC: 4.7 G/DL (ref 3.5–5.2)
ALBUMIN/GLOB SERPL: 1.5 G/DL
ALP SERPL-CCNC: 103 U/L (ref 39–117)
ALT SERPL-CCNC: 52 U/L (ref 1–33)
AST SERPL-CCNC: 56 U/L (ref 1–32)
BILIRUB SERPL-MCNC: 0.4 MG/DL (ref 0.2–1.2)
BUN SERPL-MCNC: 9 MG/DL (ref 6–20)
BUN/CREAT SERPL: 12.5 (ref 7–25)
CALCIUM SERPL-MCNC: 10.1 MG/DL (ref 8.6–10.5)
CHLORIDE SERPL-SCNC: 102 MMOL/L (ref 98–107)
CO2 SERPL-SCNC: 29.7 MMOL/L (ref 22–29)
CREAT SERPL-MCNC: 0.72 MG/DL (ref 0.57–1)
ERYTHROCYTE [DISTWIDTH] IN BLOOD BY AUTOMATED COUNT: 13.2 % (ref 12.3–15.4)
GGT SERPL-CCNC: 31 U/L (ref 5–36)
GLOBULIN SER CALC-MCNC: 3.2 GM/DL
GLUCOSE SERPL-MCNC: 181 MG/DL (ref 65–99)
HCT VFR BLD AUTO: 43.5 % (ref 34–46.6)
HGB BLD-MCNC: 13.6 G/DL (ref 12–15.9)
MCH RBC QN AUTO: 28.5 PG (ref 26.6–33)
MCHC RBC AUTO-ENTMCNC: 31.3 G/DL (ref 31.5–35.7)
MCV RBC AUTO: 91.2 FL (ref 79–97)
PLATELET # BLD AUTO: 372 10*3/MM3 (ref 140–450)
POTASSIUM SERPL-SCNC: 4.3 MMOL/L (ref 3.5–5.2)
PROT SERPL-MCNC: 7.9 G/DL (ref 6–8.5)
RBC # BLD AUTO: 4.77 10*6/MM3 (ref 3.77–5.28)
SODIUM SERPL-SCNC: 142 MMOL/L (ref 136–145)
WBC # BLD AUTO: 8.4 10*3/MM3 (ref 3.4–10.8)

## 2019-04-16 PROCEDURE — 99495 TRANSJ CARE MGMT MOD F2F 14D: CPT | Performed by: FAMILY MEDICINE

## 2019-04-16 RX ORDER — OMEPRAZOLE 20 MG/1
20 CAPSULE, DELAYED RELEASE ORAL DAILY
Qty: 90 CAPSULE | Refills: 1 | Status: SHIPPED | OUTPATIENT
Start: 2019-04-16 | End: 2019-10-24 | Stop reason: SDUPTHER

## 2019-04-16 RX ORDER — FLUCONAZOLE 150 MG/1
150 TABLET ORAL DAILY
Qty: 3 TABLET | Refills: 0 | Status: SHIPPED | OUTPATIENT
Start: 2019-04-16 | End: 2019-05-09

## 2019-04-16 RX ORDER — OMEPRAZOLE 20 MG/1
20 CAPSULE, DELAYED RELEASE ORAL DAILY
COMMUNITY
End: 2019-04-16 | Stop reason: SDUPTHER

## 2019-04-17 LAB — HCV AB S/CO SERPL IA: <0.1 S/CO RATIO (ref 0–0.9)

## 2019-04-26 ENCOUNTER — RESULTS ENCOUNTER (OUTPATIENT)
Dept: FAMILY MEDICINE CLINIC | Facility: CLINIC | Age: 59
End: 2019-04-26

## 2019-04-26 DIAGNOSIS — R74.8 ELEVATED ALKALINE PHOSPHATASE LEVEL: ICD-10-CM

## 2019-05-02 RX ORDER — MELATONIN
1000 DAILY
Qty: 90 TABLET | Refills: 2 | Status: SHIPPED | OUTPATIENT
Start: 2019-05-02 | End: 2019-09-30

## 2019-05-09 ENCOUNTER — OFFICE VISIT (OUTPATIENT)
Dept: FAMILY MEDICINE CLINIC | Facility: CLINIC | Age: 59
End: 2019-05-09

## 2019-05-09 VITALS
WEIGHT: 264 LBS | OXYGEN SATURATION: 98 % | SYSTOLIC BLOOD PRESSURE: 124 MMHG | BODY MASS INDEX: 41.44 KG/M2 | HEART RATE: 72 BPM | DIASTOLIC BLOOD PRESSURE: 74 MMHG | RESPIRATION RATE: 16 BRPM | HEIGHT: 67 IN

## 2019-05-09 DIAGNOSIS — M71.21 BAKER'S CYST OF KNEE, RIGHT: ICD-10-CM

## 2019-05-09 DIAGNOSIS — M25.561 ACUTE PAIN OF RIGHT KNEE: Primary | ICD-10-CM

## 2019-05-09 PROBLEM — Z86.0100 HISTORY OF COLON POLYPS: Status: ACTIVE | Noted: 2019-04-30

## 2019-05-09 PROBLEM — Z86.010 HISTORY OF COLON POLYPS: Status: ACTIVE | Noted: 2019-04-30

## 2019-05-09 PROCEDURE — 99213 OFFICE O/P EST LOW 20 MIN: CPT | Performed by: NURSE PRACTITIONER

## 2019-05-09 NOTE — PROGRESS NOTES
"Jemal Serrano is a 58 y.o. female. Pt is here for leg swelling .She states about 1 week ago she noticed her R leg, femoral popliteal area was swell and little tender to touch. Denies trauma or injury or history of prior injury. No recent travel, no calf pain no SOA.      Assessment/Plan   Problem List Items Addressed This Visit     None      Visit Diagnoses     Acute pain of right knee    -  Primary    Relevant Orders    Ambulatory Referral to Orthopedic Surgery    Baker's cyst of knee, right        Relevant Orders    Ambulatory Referral to Orthopedic Surgery             No Follow-up on file.  There are no Patient Instructions on file for this visit.    Chief Complaint   Patient presents with   • Leg Pain     swelling R      Social History     Tobacco Use   • Smoking status: Never Smoker   • Smokeless tobacco: Never Used   Substance Use Topics   • Alcohol use: No   • Drug use: Not on file       History of Present Illness     The following portions of the patient's history were reviewed and updated as appropriate:PMHroutine: Social history , Allergies, Current Medications, Active Problem List and Health Maintenance    Review of Systems   Musculoskeletal: Positive for gait problem and joint swelling.       Objective   Vitals:    05/09/19 1454   BP: 124/74   Pulse: 72   Resp: 16   SpO2: 98%   Weight: 120 kg (264 lb)   Height: 170.2 cm (67\")     Body mass index is 41.35 kg/m².  Physical Exam   Constitutional: She is oriented to person, place, and time. She appears well-developed and well-nourished.   HENT:   Head: Normocephalic.   Eyes: Pupils are equal, round, and reactive to light.   Neck: Normal range of motion.   Cardiovascular: Normal rate.   Pulmonary/Chest: Effort normal.   Abdominal: Soft.   Neurological: She is alert and oriented to person, place, and time.   Skin: Skin is warm and dry.   Psychiatric: She has a normal mood and affect.   Nursing note and vitals reviewed.  CALF SOFT - RIGHT KNEE WITH EFFUSION " AND PALPABLE BAKERS CYST   Reviewed Data:  Office Visit on 04/16/2019   Component Date Value Ref Range Status   • Glucose 04/16/2019 181* 65 - 99 mg/dL Final   • BUN 04/16/2019 9  6 - 20 mg/dL Final   • Creatinine 04/16/2019 0.72  0.57 - 1.00 mg/dL Final   • eGFR Non African Am 04/16/2019 83  >60 mL/min/1.73 Final   • eGFR African Am 04/16/2019 101  >60 mL/min/1.73 Final   • BUN/Creatinine Ratio 04/16/2019 12.5  7.0 - 25.0 Final   • Sodium 04/16/2019 142  136 - 145 mmol/L Final   • Potassium 04/16/2019 4.3  3.5 - 5.2 mmol/L Final   • Chloride 04/16/2019 102  98 - 107 mmol/L Final   • Total CO2 04/16/2019 29.7* 22.0 - 29.0 mmol/L Final   • Calcium 04/16/2019 10.1  8.6 - 10.5 mg/dL Final   • Total Protein 04/16/2019 7.9  6.0 - 8.5 g/dL Final   • Albumin 04/16/2019 4.70  3.50 - 5.20 g/dL Final   • Globulin 04/16/2019 3.2  gm/dL Final   • A/G Ratio 04/16/2019 1.5  g/dL Final   • Total Bilirubin 04/16/2019 0.4  0.2 - 1.2 mg/dL Final   • Alkaline Phosphatase 04/16/2019 103  39 - 117 U/L Final   • AST (SGOT) 04/16/2019 56* 1 - 32 U/L Final   • ALT (SGPT) 04/16/2019 52* 1 - 33 U/L Final   • WBC 04/16/2019 8.40  3.40 - 10.80 10*3/mm3 Final   • RBC 04/16/2019 4.77  3.77 - 5.28 10*6/mm3 Final   • Hemoglobin 04/16/2019 13.6  12.0 - 15.9 g/dL Final   • Hematocrit 04/16/2019 43.5  34.0 - 46.6 % Final   • MCV 04/16/2019 91.2  79.0 - 97.0 fL Final   • MCH 04/16/2019 28.5  26.6 - 33.0 pg Final   • MCHC 04/16/2019 31.3* 31.5 - 35.7 g/dL Final   • RDW 04/16/2019 13.2  12.3 - 15.4 % Final   • Platelets 04/16/2019 372  140 - 450 10*3/mm3 Final   • 25 Hydroxy, Vitamin D 04/16/2019 12.5* 30.0 - 100.0 ng/ml Final    Comment: Reference Range for Total Vitamin D 25(OH)  Deficiency <20.0 ng/mL  Insufficiency 21-29 ng/mL  Sufficiency  ng/mL  Toxicity >100 ng/ml     • GGT 04/16/2019 31  5 - 36 U/L Final   • Hep C Virus Ab 04/16/2019 <0.1  0.0 - 0.9 s/co ratio Final    Comment:                                   Negative:     < 0.8                                Indeterminate: 0.8 - 0.9                                    Positive:     > 0.9   The CDC recommends that a positive HCV antibody result   be followed up with a HCV Nucleic Acid Amplification   test (221900).

## 2019-05-21 ENCOUNTER — OFFICE VISIT (OUTPATIENT)
Dept: ORTHOPEDIC SURGERY | Facility: CLINIC | Age: 59
End: 2019-05-21

## 2019-05-21 VITALS — HEIGHT: 67 IN | BODY MASS INDEX: 41.03 KG/M2 | TEMPERATURE: 97.9 F | WEIGHT: 261.4 LBS

## 2019-05-21 DIAGNOSIS — M25.561 CHRONIC PAIN OF RIGHT KNEE: Primary | ICD-10-CM

## 2019-05-21 DIAGNOSIS — G89.29 CHRONIC PAIN OF RIGHT KNEE: Primary | ICD-10-CM

## 2019-05-21 DIAGNOSIS — M23.91 LOCKING OF RIGHT KNEE: ICD-10-CM

## 2019-05-21 PROCEDURE — 73562 X-RAY EXAM OF KNEE 3: CPT | Performed by: ORTHOPAEDIC SURGERY

## 2019-05-21 PROCEDURE — 99243 OFF/OP CNSLTJ NEW/EST LOW 30: CPT | Performed by: ORTHOPAEDIC SURGERY

## 2019-05-21 NOTE — PROGRESS NOTES
Patient Name: Jemal Serrano   YOB: 1960  Referring Primary Care Physician: Nilda Nayak MD  BMI: Body mass index is 40.94 kg/m².    Chief Complaint:    Chief Complaint   Patient presents with   • Right Knee - Establish Care, Pain        HPI:     Jemal Serrano is a 58 y.o. female who presents today for evaluation of   Chief Complaint   Patient presents with   • Right Knee - Establish Care, Pain   .  Patient reports intermittent atraumatic right knee pain that happened a few months ago.  It hurts when she goes up and down stairs and it has locked and caught.  She does clerical work for living she had an appendectomy 1 month ago it sharp pain that causes achy pain afterwards and some stiffness and she took some anti-inflammatories and they are not helping too much    This problem is new to this examiner.     Subjective   Medications:   Home Medications:  Current Outpatient Medications on File Prior to Visit   Medication Sig   • cholecalciferol (VITAMIN D3) 1000 units tablet Take 1 tablet by mouth Daily.   • desvenlafaxine (PRISTIQ) 50 MG 24 hr tablet Take 1 tablet by mouth Daily.   • lisinopril (PRINIVIL,ZESTRIL) 10 MG tablet Take 1 tablet by mouth Daily.   • metFORMIN (GLUMETZA) 500 MG (MOD) 24 hr tablet Take 1 tablet by mouth 2 (Two) Times a Day.   • omeprazole (priLOSEC) 20 MG capsule Take 1 capsule by mouth Daily.     No current facility-administered medications on file prior to visit.      Current Medications:  Scheduled Meds:  Continuous Infusions:  No current facility-administered medications for this visit.   PRN Meds:.    I have reviewed the patient's medical history in detail and updated the computerized patient record.  Review and summarization of old records includes:    Past Medical History:   Diagnosis Date   • Aortic aneurysm (CMS/HCC)     followed by [facility], Dr. Rodas   • Jones esophagus    • Colon polyp 2016    tubular adenoma   • Depression    •  Diabetes mellitus (CMS/HCC)    • Fatty liver    • GERD (gastroesophageal reflux disease)    • Hyperlipidemia    • Hypertension    • Melanoma (CMS/HCC)     s/p surgical removal, followed by [facility]   • Pyelonephritis    • Sleep apnea     compliant with CPAP   • Thyroid nodule    • Visual impairment     followed by [facility]        Past Surgical History:   Procedure Laterality Date   • BARIATRIC SURGERY     •  SECTION      x 2   • HYSTERECTOMY      Complete hysterectomy due to a large ovarian cyst and menorrhagia   • LAPAROSCOPIC APPENDECTOMY     • LUMBAR DISC SURGERY     • UMBILICAL HERNIA REPAIR          Social History     Occupational History   • Not on file   Tobacco Use   • Smoking status: Never Smoker   • Smokeless tobacco: Never Used   Substance and Sexual Activity   • Alcohol use: No   • Drug use: Not on file   • Sexual activity: Not on file    Social History     Social History Narrative   • Not on file        Family History   Problem Relation Age of Onset   • Irregular heart beat Mother    • Hypertension Father    • Breast cancer Sister    • Irregular heart beat Sister    • Leukemia Maternal Grandmother    • Heart attack Paternal Grandfather        ROS: 14 point review of systems was performed and all other systems were reviewed and are negative except for documented findings in HPI and today's encounter.     Allergies:   Allergies   Allergen Reactions   • Adhesive Tape Other (See Comments)     Redness when left on a while CAN USE PAPER TAPE   • Codeine Nausea And Vomiting   • Latex Rash   • Sulfa Antibiotics Itching     Constitutional:  Denies fever, shaking or chills   Eyes:  Denies change in visual acuity   HENT:  Denies nasal congestion or sore throat   Respiratory:  Denies cough or shortness of breath   Cardiovascular:  Denies chest pain or severe LE edema   GI:  Denies abdominal pain, nausea, vomiting, bloody stools or diarrhea   Musculoskeletal:  Numbness, tingling, pain,  "or loss of motor function only as noted above in history of present illness.  : Denies painful urination or hematuria  Integument:  Denies rash, lesion or ulceration   Neurologic:  Denies headache or focal weakness  Endocrine:  Denies lymphadenopathy  Psych:  Denies confusion or change in mental status   Hem:  Denies active bleeding    OBJECTIVE:  Physical Exam:   Temp 97.9 °F (36.6 °C) (Temporal)   Ht 170.2 cm (67\")   Wt 119 kg (261 lb 6.4 oz)   BMI 40.94 kg/m²     General Appearance:    Alert, cooperative, in no acute distress                  Eyes: conjunctiva clear  ENT: external ears and nose atraumatic  CV: no peripheral edema  Resp: normal respiratory effort  Skin: no rashes or wounds; normal turgor  Psych: mood and affect appropriate  Lymph: no nodes appreciated  Neuro: gross sensation intact  Vascular:  Palpable peripheral pulse in noted extremity  Musculoskeletal Extremities: Knee has medial joint line tenderness and moderate effusion Andria's causes some discomfort ligamentous exam is intact hips a little stiff but noncontributory and she walks with a limp    Radiology:   AP lateral 40 degree PA x-rays right knee without comparison view show moderate arthritic change    Assessment:     ICD-10-CM ICD-9-CM   1. Chronic pain of right knee M25.561 719.46    G89.29 338.29   2. Locking of right knee M23.91 717.9        Procedures       Plan: Biomechanics of pertinent body area discussed.  Risks, benefits, alternatives, comparisons, and complications of accepted medicines, injections, recommendations, surgical procedures, and therapies explained and education provided in laymen's terms. Natural history and expected course of this patient's diagnosis discussed along with evaluation of therapies. Questions answered. When appropriate I also discussed proper use of cane, walker, trekking poles.   BMI:  The concept of BMI body mass index and its importance and implications discussed.  BMI suggested to be < 40 " or as low as possible. Lifestyle measures for weight loss and how this affects orthopedic condition.  EXERCISES:  Advice on benefits of, and types of regular/moderate exercise including biomechanical forces involved as it pertains to this complaint.  MEDICATIONS:  Prescription, OTC and Monitoring of Medications per orders to address ortho complaints; Evaluation and discussion of safety, precautions, side effects, and warnings given especially of long term NSAID or steroid therapy.    MRI.see if there is mechanical findings on the MRI.  I went over safe dosing of over-the-counter anti-inflammatories have her do that.  If there is findings on the MRI we will see her back otherwise inflammatories and consider physical therapy as an option if she fails to get better      5/21/2019    Much of this encounter note is an electronic transcription/translation of spoken language to printed text. The electronic translation of spoken language may permit erroneous, or at times, nonsensical words or phrases to be inadvertently transcribed; Although I have reviewed the note for such errors, some may still exist

## 2019-06-20 ENCOUNTER — HOSPITAL ENCOUNTER (OUTPATIENT)
Dept: MRI IMAGING | Facility: HOSPITAL | Age: 59
Discharge: HOME | End: 2019-06-20
Admitting: ORTHOPAEDIC SURGERY

## 2019-06-20 DIAGNOSIS — G89.29 CHRONIC PAIN OF RIGHT KNEE: ICD-10-CM

## 2019-06-20 DIAGNOSIS — M25.561 CHRONIC PAIN OF RIGHT KNEE: ICD-10-CM

## 2019-06-20 DIAGNOSIS — M23.91 LOCKING OF RIGHT KNEE: ICD-10-CM

## 2019-06-20 PROCEDURE — 73721 MRI JNT OF LWR EXTRE W/O DYE: CPT

## 2019-06-25 ENCOUNTER — TELEPHONE (OUTPATIENT)
Dept: ORTHOPEDIC SURGERY | Facility: CLINIC | Age: 59
End: 2019-06-25

## 2019-06-25 NOTE — TELEPHONE ENCOUNTER
Called patient with MRI results. No answer, so I left a voice message with our phone number for her to call us back.       ----- Message from STEPHON Atkinson sent at 6/21/2019 11:01 AM EDT -----  Please let her know that her MRI is negative for meniscal tear. She does have arthritis behind her knee cap and if it is not improving with the NSAIDS (antiinflammatories) ice etc as discussed with SPM we would be glad to order some physical therapy for her to help with restrengthening.

## 2019-06-27 ENCOUNTER — APPOINTMENT (OUTPATIENT)
Dept: WOMENS IMAGING | Facility: HOSPITAL | Age: 59
End: 2019-06-27

## 2019-06-27 PROCEDURE — MDREVIEWSP: Performed by: RADIOLOGY

## 2019-06-27 PROCEDURE — 77063 BREAST TOMOSYNTHESIS BI: CPT | Performed by: RADIOLOGY

## 2019-06-27 PROCEDURE — 77067 SCR MAMMO BI INCL CAD: CPT | Performed by: RADIOLOGY

## 2019-09-25 DIAGNOSIS — F41.9 ANXIETY: ICD-10-CM

## 2019-09-25 RX ORDER — DESVENLAFAXINE SUCCINATE 50 MG/1
50 TABLET, EXTENDED RELEASE ORAL DAILY
Qty: 7 TABLET | Refills: 0 | Status: SHIPPED | OUTPATIENT
Start: 2019-09-25 | End: 2019-09-30 | Stop reason: SDUPTHER

## 2019-09-25 RX ORDER — DESVENLAFAXINE SUCCINATE 50 MG/1
50 TABLET, EXTENDED RELEASE ORAL DAILY
Qty: 90 TABLET | Refills: 0 | OUTPATIENT
Start: 2019-09-25

## 2019-09-30 ENCOUNTER — OFFICE VISIT (OUTPATIENT)
Dept: FAMILY MEDICINE CLINIC | Facility: CLINIC | Age: 59
End: 2019-09-30

## 2019-09-30 VITALS
WEIGHT: 264 LBS | OXYGEN SATURATION: 98 % | DIASTOLIC BLOOD PRESSURE: 90 MMHG | HEART RATE: 80 BPM | BODY MASS INDEX: 41.44 KG/M2 | SYSTOLIC BLOOD PRESSURE: 148 MMHG | HEIGHT: 67 IN

## 2019-09-30 DIAGNOSIS — R73.03 PREDIABETES: Primary | ICD-10-CM

## 2019-09-30 DIAGNOSIS — F41.9 ANXIETY: ICD-10-CM

## 2019-09-30 PROBLEM — K76.0 FATTY LIVER: Status: ACTIVE | Noted: 2019-08-17

## 2019-09-30 PROBLEM — E11.9 TYPE 2 DIABETES MELLITUS: Status: ACTIVE | Noted: 2019-08-17

## 2019-09-30 PROBLEM — F32.A DEPRESSION: Status: ACTIVE | Noted: 2019-08-17

## 2019-09-30 PROCEDURE — 99213 OFFICE O/P EST LOW 20 MIN: CPT | Performed by: NURSE PRACTITIONER

## 2019-09-30 RX ORDER — DESVENLAFAXINE SUCCINATE 50 MG/1
50 TABLET, EXTENDED RELEASE ORAL DAILY
Qty: 30 TABLET | Refills: 6 | Status: SHIPPED | OUTPATIENT
Start: 2019-09-30 | End: 2020-04-03 | Stop reason: SDUPTHER

## 2019-09-30 NOTE — PROGRESS NOTES
Subjective   Jemal Serrano is a 58 y.o. female. She was previously seen by Dr. Nayak, but I new to me.     History of Present Illness   Pt is being seen today for follow-up for anxiety, chronic, ongoing.  She is taking Pristiq daily and reports that she feels this is helping with her anxiety.     Pt is also being seen for prediabetes, chronic, ongoing. She was seen in ER in August and her blood glucose was 309. Pt has not had a recent A1C. She is taking metformin 500mg 1 p.o. BID. I discussed ADA diet at length. Pt is starting weight watchers program this evening.     The following portions of the patient's history were reviewed and updated as appropriate: allergies, current medications, past family history, past medical history, past social history, past surgical history and problem list.    Review of Systems   Constitutional: Negative for appetite change, chills, fatigue and fever.   Eyes: Negative.    Respiratory: Negative for cough, chest tightness and shortness of breath.    Cardiovascular: Positive for leg swelling. Negative for chest pain and palpitations.        Ankles swell during the day.    Endocrine: Positive for heat intolerance and polydipsia. Negative for cold intolerance, polyphagia and polyuria.   Skin: Negative for dry skin.   Allergic/Immunologic: Negative.    Neurological: Negative for dizziness, weakness and headache.   Psychiatric/Behavioral: Negative for depressed mood. The patient is not nervous/anxious.        Objective   Physical Exam   Constitutional: She is oriented to person, place, and time. She appears well-developed and well-nourished.   HENT:   Head: Normocephalic and atraumatic.   Eyes: Conjunctivae and EOM are normal. Pupils are equal, round, and reactive to light.   Neck: Normal range of motion. Neck supple. No thyromegaly present.   Cardiovascular: Normal rate, regular rhythm, normal heart sounds and intact distal pulses.   No murmur heard.  Pulses:       Dorsalis pedis  pulses are 1+ on the right side, and 1+ on the left side.        Posterior tibial pulses are 1+ on the right side, and 1+ on the left side.   Pulmonary/Chest: Effort normal and breath sounds normal.   Musculoskeletal: Normal range of motion.   Lymphadenopathy:     She has no cervical adenopathy.   Neurological: She is alert and oriented to person, place, and time. No cranial nerve deficit.   Skin: Skin is warm and dry. Capillary refill takes 2 to 3 seconds.   Psychiatric: She has a normal mood and affect. Her behavior is normal. Judgment and thought content normal.   Nursing note and vitals reviewed.      Vitals:    09/30/19 0858   BP: 148/90   Pulse: 80   SpO2: 98%     Body mass index is 41.35 kg/m².    Procedures    Assessment/Plan   Problems Addressed this Visit     None      Visit Diagnoses     Prediabetes    -  Primary    Relevant Orders    Hemoglobin A1c    Anxiety        Relevant Medications    desvenlafaxine (PRISTIQ) 50 MG 24 hr tablet        Follow-up in 6 months or as needed.       Patient Instructions   I will call you with your lab results.   Please call with any questions or concerns.     Prediabetes Eating Plan  Prediabetes is a condition that causes blood sugar (glucose) levels to be higher than normal. This increases the risk for developing diabetes. In order to prevent diabetes from developing, your health care provider may recommend a diet and other lifestyle changes to help you:  · Control your blood glucose levels.  · Improve your cholesterol levels.  · Manage your blood pressure.  Your health care provider may recommend working with a diet and nutrition specialist (dietitian) to make a meal plan that is best for you.  What are tips for following this plan?  Lifestyle  · Set weight loss goals with the help of your health care team. It is recommended that most people with prediabetes lose 7% of their current body weight.  · Exercise for at least 30 minutes at least 5 days a week.  · Attend a  support group or seek ongoing support from a mental health counselor.  · Take over-the-counter and prescription medicines only as told by your health care provider.  Reading food labels  · Read food labels to check the amount of fat, salt (sodium), and sugar in prepackaged foods. Avoid foods that have:  ? Saturated fats.  ? Trans fats.  ? Added sugars.  · Avoid foods that have more than 300 milligrams (mg) of sodium per serving. Limit your daily sodium intake to less than 2,300 mg each day.  Shopping  · Avoid buying pre-made and processed foods.  Cooking  · Cook with olive oil. Do not use butter, lard, or ghee.  · Bake, broil, grill, or boil foods. Avoid frying.  Meal planning    · Work with your dietitian to develop an eating plan that is right for you. This may include:  ? Tracking how many calories you take in. Use a food diary, notebook, or mobile application to track what you eat at each meal.  ? Using the glycemic index (GI) to plan your meals. The index tells you how quickly a food will raise your blood glucose. Choose low-GI foods. These foods take a longer time to raise blood glucose.  · Consider following a Mediterranean diet. This diet includes:  ? Several servings each day of fresh fruits and vegetables.  ? Eating fish at least twice a week.  ? Several servings each day of whole grains, beans, nuts, and seeds.  ? Using olive oil instead of other fats.  ? Moderate alcohol consumption.  ? Eating small amounts of red meat and whole-fat dairy.  · If you have high blood pressure, you may need to limit your sodium intake or follow a diet such as the DASH eating plan. DASH is an eating plan that aims to lower high blood pressure.  What foods are recommended?  The items listed below may not be a complete list. Talk with your dietitian about what dietary choices are best for you.  Grains  Whole grains, such as whole-wheat or whole-grain breads, crackers, cereals, and pasta. Unsweetened oatmeal. Bulgur. Barley.  Quinoa. Brown rice. Corn or whole-wheat flour tortillas or taco shells.  Vegetables  Lettuce. Spinach. Peas. Beets. Cauliflower. Cabbage. Broccoli. Carrots. Tomatoes. Squash. Eggplant. Herbs. Peppers. Onions. Cucumbers. [place] sprouts.  Fruits  Berries. Bananas. Apples. Oranges. Grapes. Papaya. Rhys. Pomegranate. Kiwi. Grapefruit. Cherries.  Meats and other protein foods  Seafood. Poultry without skin. Lean cuts of pork and beef. Tofu. Eggs. Nuts. Beans.  Dairy  Low-fat or fat-free dairy products, such as yogurt, cottage cheese, and cheese.  Beverages  Water. Tea. Coffee. Sugar-free or diet soda. [place] water. Lowfat or no-fat milk. Milk alternatives, such as soy or almond milk.  Fats and oils  Olive oil. Canola oil. Sunflower oil. Grapeseed oil. Avocado. Walnuts.  Sweets and desserts  Sugar-free or low-fat pudding. Sugar-free or low-fat ice cream and other frozen treats.  Seasoning and other foods  Herbs. Sodium-free spices. Mustard. Relish. Low-fat, low-sugar ketchup. Low-fat, low-sugar barbecue sauce. Low-fat or fat-free mayonnaise.  What foods are not recommended?  The items listed below may not be a complete list. Talk with your dietitian about what dietary choices are best for you.  Grains  Refined white flour and flour products, such as bread, pasta, snack foods, and cereals.  Vegetables  Canned vegetables. Frozen vegetables with butter or cream sauce.  Fruits  Fruits canned with syrup.  Meats and other protein foods  Fatty cuts of meat. Poultry with skin. Breaded or fried meat. Processed meats.  Dairy  Full-fat yogurt, cheese, or milk.  Beverages  Sweetened drinks, such as sweet iced tea and soda.  Fats and oils  Butter. Lard. Ghee.  Sweets and desserts  Baked goods, such as cake, cupcakes, pastries, cookies, and cheesecake.  Seasoning and other foods  Spice mixes with added salt. Ketchup. Barbecue sauce. Mayonnaise.  Summary  · To prevent diabetes from developing, you may need to make diet and other  lifestyle changes to help control blood sugar, improve cholesterol levels, and manage your blood pressure.  · Set weight loss goals with the help of your health care team. It is recommended that most people with prediabetes lose 7 percent of their current body weight.  · Consider following a Mediterranean diet that includes plenty of fresh fruits and vegetables, whole grains, beans, nuts, seeds, fish, lean meat, low-fat dairy, and healthy oils.  This information is not intended to replace advice given to you by your health care provider. Make sure you discuss any questions you have with your health care provider.  Document Released: 05/03/2016 Document Revised: 02/21/2018 Document Reviewed: 02/21/2018  [employer] Interactive Patient Education © 2019 [employer]

## 2019-09-30 NOTE — PATIENT INSTRUCTIONS
I will call you with your lab results.   Please call with any questions or concerns.     Prediabetes Eating Plan  Prediabetes is a condition that causes blood sugar (glucose) levels to be higher than normal. This increases the risk for developing diabetes. In order to prevent diabetes from developing, your health care provider may recommend a diet and other lifestyle changes to help you:  · Control your blood glucose levels.  · Improve your cholesterol levels.  · Manage your blood pressure.  Your health care provider may recommend working with a diet and nutrition specialist (dietitian) to make a meal plan that is best for you.  What are tips for following this plan?  Lifestyle  · Set weight loss goals with the help of your health care team. It is recommended that most people with prediabetes lose 7% of their current body weight.  · Exercise for at least 30 minutes at least 5 days a week.  · Attend a support group or seek ongoing support from a mental health counselor.  · Take over-the-counter and prescription medicines only as told by your health care provider.  Reading food labels  · Read food labels to check the amount of fat, salt (sodium), and sugar in prepackaged foods. Avoid foods that have:  ? Saturated fats.  ? Trans fats.  ? Added sugars.  · Avoid foods that have more than 300 milligrams (mg) of sodium per serving. Limit your daily sodium intake to less than 2,300 mg each day.  Shopping  · Avoid buying pre-made and processed foods.  Cooking  · Cook with olive oil. Do not use butter, lard, or ghee.  · Bake, broil, grill, or boil foods. Avoid frying.  Meal planning    · Work with your dietitian to develop an eating plan that is right for you. This may include:  ? Tracking how many calories you take in. Use a food diary, notebook, or mobile application to track what you eat at each meal.  ? Using the glycemic index (GI) to plan your meals. The index tells you how quickly a food will raise your blood glucose.  Choose low-GI foods. These foods take a longer time to raise blood glucose.  · Consider following a Mediterranean diet. This diet includes:  ? Several servings each day of fresh fruits and vegetables.  ? Eating fish at least twice a week.  ? Several servings each day of whole grains, beans, nuts, and seeds.  ? Using olive oil instead of other fats.  ? Moderate alcohol consumption.  ? Eating small amounts of red meat and whole-fat dairy.  · If you have high blood pressure, you may need to limit your sodium intake or follow a diet such as the DASH eating plan. DASH is an eating plan that aims to lower high blood pressure.  What foods are recommended?  The items listed below may not be a complete list. Talk with your dietitian about what dietary choices are best for you.  Grains  Whole grains, such as whole-wheat or whole-grain breads, crackers, cereals, and pasta. Unsweetened oatmeal. Bulgur. Barley. Quinoa. Brown rice. Corn or whole-wheat flour tortillas or taco shells.  Vegetables  Lettuce. Spinach. Peas. Beets. Cauliflower. Cabbage. Broccoli. Carrots. Tomatoes. Squash. Eggplant. Herbs. Peppers. Onions. Cucumbers. [place] sprouts.  Fruits  Berries. Bananas. Apples. Oranges. Grapes. Papaya. Rhys. Pomegranate. Kiwi. Grapefruit. Cherries.  Meats and other protein foods  Seafood. Poultry without skin. Lean cuts of pork and beef. Tofu. Eggs. Nuts. Beans.  Dairy  Low-fat or fat-free dairy products, such as yogurt, cottage cheese, and cheese.  Beverages  Water. Tea. Coffee. Sugar-free or diet soda. [place] water. Lowfat or no-fat milk. Milk alternatives, such as soy or almond milk.  Fats and oils  Olive oil. Canola oil. Sunflower oil. Grapeseed oil. Avocado. Walnuts.  Sweets and desserts  Sugar-free or low-fat pudding. Sugar-free or low-fat ice cream and other frozen treats.  Seasoning and other foods  Herbs. Sodium-free spices. Mustard. Relish. Low-fat, low-sugar ketchup. Low-fat, low-sugar barbecue sauce. Low-fat or  fat-free mayonnaise.  What foods are not recommended?  The items listed below may not be a complete list. Talk with your dietitian about what dietary choices are best for you.  Grains  Refined white flour and flour products, such as bread, pasta, snack foods, and cereals.  Vegetables  Canned vegetables. Frozen vegetables with butter or cream sauce.  Fruits  Fruits canned with syrup.  Meats and other protein foods  Fatty cuts of meat. Poultry with skin. Breaded or fried meat. Processed meats.  Dairy  Full-fat yogurt, cheese, or milk.  Beverages  Sweetened drinks, such as sweet iced tea and soda.  Fats and oils  Butter. Lard. Ghee.  Sweets and desserts  Baked goods, such as cake, cupcakes, pastries, cookies, and cheesecake.  Seasoning and other foods  Spice mixes with added salt. Ketchup. Barbecue sauce. Mayonnaise.  Summary  · To prevent diabetes from developing, you may need to make diet and other lifestyle changes to help control blood sugar, improve cholesterol levels, and manage your blood pressure.  · Set weight loss goals with the help of your health care team. It is recommended that most people with prediabetes lose 7 percent of their current body weight.  · Consider following a Mediterranean diet that includes plenty of fresh fruits and vegetables, whole grains, beans, nuts, seeds, fish, lean meat, low-fat dairy, and healthy oils.  This information is not intended to replace advice given to you by your health care provider. Make sure you discuss any questions you have with your health care provider.  Document Released: 05/03/2016 Document Revised: 02/21/2018 Document Reviewed: 02/21/2018  [employer] Patient Education © 2019 [employer]

## 2019-10-01 DIAGNOSIS — E11.69 TYPE 2 DIABETES MELLITUS WITH OTHER SPECIFIED COMPLICATION, WITHOUT LONG-TERM CURRENT USE OF INSULIN (HCC): Primary | ICD-10-CM

## 2019-10-01 LAB — HBA1C MFR BLD: 7.9 % (ref 4.8–5.6)

## 2019-10-24 DIAGNOSIS — K21.9 GASTROESOPHAGEAL REFLUX DISEASE, ESOPHAGITIS PRESENCE NOT SPECIFIED: ICD-10-CM

## 2019-10-24 RX ORDER — OMEPRAZOLE 20 MG/1
20 CAPSULE, DELAYED RELEASE ORAL DAILY
Qty: 90 CAPSULE | Refills: 1 | Status: SHIPPED | OUTPATIENT
Start: 2019-10-24 | End: 2020-04-27

## 2019-12-23 ENCOUNTER — HOSPITAL ENCOUNTER (OUTPATIENT)
Dept: GENERAL RADIOLOGY | Facility: HOSPITAL | Age: 59
Discharge: HOME | End: 2019-12-23
Admitting: NURSE PRACTITIONER

## 2019-12-23 ENCOUNTER — OFFICE VISIT (OUTPATIENT)
Dept: FAMILY MEDICINE CLINIC | Facility: CLINIC | Age: 59
End: 2019-12-23

## 2019-12-23 VITALS
RESPIRATION RATE: 16 BRPM | DIASTOLIC BLOOD PRESSURE: 62 MMHG | HEART RATE: 74 BPM | OXYGEN SATURATION: 96 % | SYSTOLIC BLOOD PRESSURE: 122 MMHG | WEIGHT: 264.1 LBS | BODY MASS INDEX: 41.45 KG/M2 | HEIGHT: 67 IN

## 2019-12-23 DIAGNOSIS — M25.511 ACUTE PAIN OF RIGHT SHOULDER: Primary | ICD-10-CM

## 2019-12-23 DIAGNOSIS — M25.511 ACUTE PAIN OF RIGHT SHOULDER: ICD-10-CM

## 2019-12-23 PROCEDURE — 99213 OFFICE O/P EST LOW 20 MIN: CPT | Performed by: NURSE PRACTITIONER

## 2019-12-23 PROCEDURE — 72040 X-RAY EXAM NECK SPINE 2-3 VW: CPT

## 2019-12-23 NOTE — PATIENT INSTRUCTIONS
Return if symptoms worsen or fail to improve.   May continue over the counter ibuprofen as directed for pain.

## 2019-12-23 NOTE — PROGRESS NOTES
Subjective   Jemal Serrano is a 59 y.o. female.   Patient here for right shoulder pain that radiates into the hand and last 2 fingers.  Patient denies numbness or tingling, but admits weakness to the hand and dropping objects also has some neck pain. She reports that symptoms started about 2 weeks. She has taken over the counter Tylenol and ibuprofen for her symptoms, which she states did not help when pain was at it's worst.  Pt denies any injuries.     History of Present Illness     The following portions of the patient's history were reviewed and updated as appropriate: allergies, current medications, past family history, past medical history, past social history, past surgical history and problem list.    Review of Systems   Constitutional: Negative for chills, fatigue and fever.   Respiratory: Negative for cough and shortness of breath.    Cardiovascular: Negative for chest pain, palpitations and leg swelling.   Musculoskeletal: Positive for arthralgias, myalgias, neck pain and neck stiffness. Negative for back pain, gait problem and joint swelling.        Right shoulder pain with radiation to right lower arm and hand.    Skin: Negative for dry skin.   Neurological: Positive for weakness. Negative for dizziness and headache.        Right hand weakness   Psychiatric/Behavioral: Negative.        Objective   Physical Exam   Constitutional: She is oriented to person, place, and time. She appears well-developed and well-nourished.   HENT:   Head: Normocephalic and atraumatic.   Eyes: Pupils are equal, round, and reactive to light. Conjunctivae and EOM are normal.   Neck: Normal range of motion and full passive range of motion without pain. Neck supple. No thyromegaly present.   Cardiovascular: Normal rate, regular rhythm, normal heart sounds and intact distal pulses.   No murmur heard.  Pulmonary/Chest: Effort normal and breath sounds normal.   Musculoskeletal: She exhibits no edema or deformity.        Right  shoulder: She exhibits decreased range of motion, tenderness and pain. She exhibits no swelling and no effusion.   Lymphadenopathy:     She has no cervical adenopathy.   Neurological: She is alert and oriented to person, place, and time.   Skin: Skin is warm and dry.   Psychiatric: She has a normal mood and affect. Her behavior is normal. Judgment and thought content normal.   Nursing note and vitals reviewed.      Vitals:    12/23/19 0926   BP: 122/62   Pulse: 74   Resp: 16   SpO2: 96%     Body mass index is 41.36 kg/m².    Procedures    Assessment/Plan   Problems Addressed this Visit     None      Visit Diagnoses     Acute pain of right shoulder    -  Primary    Relevant Orders    XR Spine Cervical 2 or 3 View    Ambulatory Referral to Orthopedic Surgery        Return if symptoms worsen or fail to improve.         Patient Instructions   Return if symptoms worsen or fail to improve.   May continue over the counter ibuprofen as directed for pain.

## 2020-01-06 ENCOUNTER — RESULTS ENCOUNTER (OUTPATIENT)
Dept: FAMILY MEDICINE CLINIC | Facility: CLINIC | Age: 60
End: 2020-01-06

## 2020-01-06 DIAGNOSIS — E11.69 TYPE 2 DIABETES MELLITUS WITH OTHER SPECIFIED COMPLICATION, WITHOUT LONG-TERM CURRENT USE OF INSULIN (HCC): ICD-10-CM

## 2020-03-12 DIAGNOSIS — E11.9 TYPE 2 DIABETES MELLITUS WITHOUT COMPLICATION, WITHOUT LONG-TERM CURRENT USE OF INSULIN (HCC): Primary | ICD-10-CM

## 2020-03-12 LAB — HBA1C MFR BLD: 7.5 % (ref 4.8–5.6)

## 2020-04-03 ENCOUNTER — TELEMEDICINE (OUTPATIENT)
Dept: FAMILY MEDICINE CLINIC | Facility: CLINIC | Age: 60
End: 2020-04-03

## 2020-04-03 DIAGNOSIS — F41.9 ANXIETY: ICD-10-CM

## 2020-04-03 DIAGNOSIS — E11.9 TYPE 2 DIABETES MELLITUS WITHOUT COMPLICATION, WITHOUT LONG-TERM CURRENT USE OF INSULIN (HCC): Primary | ICD-10-CM

## 2020-04-03 PROCEDURE — 99213 OFFICE O/P EST LOW 20 MIN: CPT | Performed by: NURSE PRACTITIONER

## 2020-04-03 RX ORDER — DESVENLAFAXINE SUCCINATE 50 MG/1
50 TABLET, EXTENDED RELEASE ORAL DAILY
Qty: 90 TABLET | Refills: 1 | Status: SHIPPED | OUTPATIENT
Start: 2020-04-03 | End: 2020-07-09 | Stop reason: DRUGHIGH

## 2020-04-03 NOTE — PATIENT INSTRUCTIONS
Continue Tradjenta and Metformin as prescribed.   Call office and change appointment to June, for annual exam and labs. We will repeat your A1C at this time.   Call with any questions or concerns.      Diabetes Mellitus and Nutrition, Adult  When you have diabetes (diabetes mellitus), it is very important to have healthy eating habits because your blood sugar (glucose) levels are greatly affected by what you eat and drink. Eating healthy foods in the appropriate amounts, at about the same times every day, can help you:  · Control your blood glucose.  · Lower your risk of heart disease.  · Improve your blood pressure.  · Reach or maintain a healthy weight.  Every person with diabetes is different, and each person has different needs for a meal plan. Your health care provider may recommend that you work with a diet and nutrition specialist (dietitian) to make a meal plan that is best for you. Your meal plan may vary depending on factors such as:  · The calories you need.  · The medicines you take.  · Your weight.  · Your blood glucose, blood pressure, and cholesterol levels.  · Your activity level.  · Other health conditions you have, such as heart or kidney disease.  How do carbohydrates affect me?  Carbohydrates, also called carbs, affect your blood glucose level more than any other type of food. Eating carbs naturally raises the amount of glucose in your blood. Carb counting is a method for keeping track of how many carbs you eat. Counting carbs is important to keep your blood glucose at a healthy level, especially if you use insulin or take certain oral diabetes medicines.  It is important to know how many carbs you can safely have in each meal. This is different for every person. Your dietitian can help you calculate how many carbs you should have at each meal and for each snack.  Foods that contain carbs include:  · Bread, cereal, rice, pasta, and crackers.  · Potatoes and corn.  · Peas, beans, and  "lentils.  · Milk and yogurt.  · Fruit and juice.  · Desserts, such as cakes, cookies, ice cream, and candy.  How does alcohol affect me?  Alcohol can cause a sudden decrease in blood glucose (hypoglycemia), especially if you use insulin or take certain oral diabetes medicines. Hypoglycemia can be a life-threatening condition. Symptoms of hypoglycemia (sleepiness, dizziness, and confusion) are similar to symptoms of having too much alcohol.  If your health care provider says that alcohol is safe for you, follow these guidelines:  · Limit alcohol intake to no more than 1 drink per day for nonpregnant women and 2 drinks per day for men. One drink equals 12 oz of beer, 5 oz of wine, or 1½ oz of hard liquor.  · Do not drink on an empty stomach.  · Keep yourself hydrated with water, diet soda, or unsweetened iced tea.  · Keep in mind that regular soda, juice, and other mixers may contain a lot of sugar and must be counted as carbs.  What are tips for following this plan?    Reading food labels  · Start by checking the serving size on the \"Nutrition Facts\" label of packaged foods and drinks. The amount of calories, carbs, fats, and other nutrients listed on the label is based on one serving of the item. Many items contain more than one serving per package.  · Check the total grams (g) of carbs in one serving. You can calculate the number of servings of carbs in one serving by dividing the total carbs by 15. For example, if a food has 30 g of total carbs, it would be equal to 2 servings of carbs.  · Check the number of grams (g) of saturated and trans fats in one serving. Choose foods that have low or no amount of these fats.  · Check the number of milligrams (mg) of salt (sodium) in one serving. Most people should limit total sodium intake to less than 2,300 mg per day.  · Always check the nutrition information of foods labeled as \"low-fat\" or \"nonfat\". These foods may be higher in added sugar or refined carbs and should " be avoided.  · Talk to your dietitian to identify your daily goals for nutrients listed on the label.  Shopping  · Avoid buying canned, premade, or processed foods. These foods tend to be high in fat, sodium, and added sugar.  · Shop around the outside edge of the grocery store. This includes fresh fruits and vegetables, bulk grains, fresh meats, and fresh dairy.  Cooking  · Use low-heat cooking methods, such as baking, instead of high-heat cooking methods like deep frying.  · Cook using healthy oils, such as olive, canola, or sunflower oil.  · Avoid cooking with butter, cream, or high-fat meats.  Meal planning  · Eat meals and snacks regularly, preferably at the same times every day. Avoid going long periods of time without eating.  · Eat foods high in fiber, such as fresh fruits, vegetables, beans, and whole grains. Talk to your dietitian about how many servings of carbs you can eat at each meal.  · Eat 4-6 ounces (oz) of lean protein each day, such as lean meat, chicken, fish, eggs, or tofu. One oz of lean protein is equal to:  ? 1 oz of meat, chicken, or fish.  ? 1 egg.  ? ¼ cup of tofu.  · Eat some foods each day that contain healthy fats, such as avocado, nuts, seeds, and fish.  Lifestyle  · Check your blood glucose regularly.  · Exercise regularly as told by your health care provider. This may include:  ? 150 minutes of moderate-intensity or vigorous-intensity exercise each week. This could be brisk walking, biking, or water aerobics.  ? Stretching and doing strength exercises, such as yoga or weightlifting, at least 2 times a week.  · Take medicines as told by your health care provider.  · Do not use any products that contain nicotine or tobacco, such as cigarettes and e-cigarettes. If you need help quitting, ask your health care provider.  · Work with a counselor or diabetes educator to identify strategies to manage stress and any emotional and social challenges.  Questions to ask a health care  provider  · Do I need to meet with a diabetes educator?  · Do I need to meet with a dietitian?  · What number can I call if I have questions?  · When are the best times to check my blood glucose?  Where to find more information:  · American Diabetes Association: diabetes.org  · Academy of Nutrition and Dietetics: www.eatright.org  · National [place] of Diabetes and Digestive and Kidney Diseases (NIH): www.niddk.nih.gov  Summary  · A healthy meal plan will help you control your blood glucose and maintain a healthy lifestyle.  · Working with a diet and nutrition specialist (dietitian) can help you make a meal plan that is best for you.  · Keep in mind that carbohydrates (carbs) and alcohol have immediate effects on your blood glucose levels. It is important to count carbs and to use alcohol carefully.  This information is not intended to replace advice given to you by your health care provider. Make sure you discuss any questions you have with your health care provider.  Document Released: 09/14/2006 Document Revised: 01/14/2020 Document Reviewed: 01/22/2018  [employer] Interactive Patient Education © 2020 [employer]

## 2020-04-03 NOTE — PROGRESS NOTES
"Estefanía Serrano is a 59 y.o. female.     History of Present Illness   Pt presents via tele-visit for follow-up for diabetes, chronic, ongoing, uncontrolled. She was started on Tradjenta 5mg after her latest A1C in March, which was 7.5.  Pt is also taking metformin twice daily.  She states that she delayed starting the Tradjenta \"for a while\" after receiving the prescription and states that she \"doesn't know why she did that\". She reports that she is not exercising regularly and that she does not check her BS at home.     Pt is also being seen for follow-up for anxiety, chronic, ongoing.  Pt reports that due to current pandemic, that her anxiety has been a \"little higher'. Pt states that she is having to watch her 2 year old grandson 4 days a week. She is taking pristiq daily for her anxiety and states that she does need a refill on this.     The following portions of the patient's history were reviewed and updated as appropriate: allergies, current medications, past family history, past medical history, past social history, past surgical history and problem list.    Review of Systems   Constitutional: Negative for chills, fatigue and fever.   Respiratory: Negative for cough and shortness of breath.    Cardiovascular: Negative for chest pain, palpitations and leg swelling.   Endocrine: Negative for cold intolerance, heat intolerance, polydipsia, polyphagia and polyuria.   Neurological: Negative for dizziness and headache.   Hematological: Negative.    Psychiatric/Behavioral: Negative for sleep disturbance, suicidal ideas and depressed mood. The patient is nervous/anxious.        Objective   Physical Exam     This vist was completed via televisit due to the restrictions of the COVID-19 pandemic. All issues as below were discussed and addressed. If it was felt that the patient should be evaluated in clinic, then they were directed there.  The patient verbally consented to visit.     There were no vitals " filed for this visit.  There is no height or weight on file to calculate BMI.    Procedures    Assessment/Plan   Problems Addressed this Visit        Endocrine    Type 2 diabetes mellitus (CMS/[facility]) - Primary  Continue metformin 1000mg 1 p.o. BID  Continue Tradjenta 5mg 1 p.o. QD      Other Visit Diagnoses     Anxiety        Relevant Medications    desvenlafaxine (PRISTIQ) 50 MG 24 hr tablet        Return in about 2 months (around 6/3/2020) for Annual, Labs.              Patient Instructions   Continue Tradjenta and Metformin as prescribed.   Call office and change appointment to June, for annual exam and labs. We will repeat your A1C at this time.   Call with any questions or concerns.      Diabetes Mellitus and Nutrition, Adult  When you have diabetes (diabetes mellitus), it is very important to have healthy eating habits because your blood sugar (glucose) levels are greatly affected by what you eat and drink. Eating healthy foods in the appropriate amounts, at about the same times every day, can help you:  · Control your blood glucose.  · Lower your risk of heart disease.  · Improve your blood pressure.  · Reach or maintain a healthy weight.  Every person with diabetes is different, and each person has different needs for a meal plan. Your health care provider may recommend that you work with a diet and nutrition specialist (dietitian) to make a meal plan that is best for you. Your meal plan may vary depending on factors such as:  · The calories you need.  · The medicines you take.  · Your weight.  · Your blood glucose, blood pressure, and cholesterol levels.  · Your activity level.  · Other health conditions you have, such as heart or kidney disease.  How do carbohydrates affect me?  Carbohydrates, also called carbs, affect your blood glucose level more than any other type of food. Eating carbs naturally raises the amount of glucose in your blood. Carb counting is a method for keeping track of how many carbs you  "eat. Counting carbs is important to keep your blood glucose at a healthy level, especially if you use insulin or take certain oral diabetes medicines.  It is important to know how many carbs you can safely have in each meal. This is different for every person. Your dietitian can help you calculate how many carbs you should have at each meal and for each snack.  Foods that contain carbs include:  · Bread, cereal, rice, pasta, and crackers.  · Potatoes and corn.  · Peas, beans, and lentils.  · Milk and yogurt.  · Fruit and juice.  · Desserts, such as cakes, cookies, ice cream, and candy.  How does alcohol affect me?  Alcohol can cause a sudden decrease in blood glucose (hypoglycemia), especially if you use insulin or take certain oral diabetes medicines. Hypoglycemia can be a life-threatening condition. Symptoms of hypoglycemia (sleepiness, dizziness, and confusion) are similar to symptoms of having too much alcohol.  If your health care provider says that alcohol is safe for you, follow these guidelines:  · Limit alcohol intake to no more than 1 drink per day for nonpregnant women and 2 drinks per day for men. One drink equals 12 oz of beer, 5 oz of wine, or 1½ oz of hard liquor.  · Do not drink on an empty stomach.  · Keep yourself hydrated with water, diet soda, or unsweetened iced tea.  · Keep in mind that regular soda, juice, and other mixers may contain a lot of sugar and must be counted as carbs.  What are tips for following this plan?    Reading food labels  · Start by checking the serving size on the \"Nutrition Facts\" label of packaged foods and drinks. The amount of calories, carbs, fats, and other nutrients listed on the label is based on one serving of the item. Many items contain more than one serving per package.  · Check the total grams (g) of carbs in one serving. You can calculate the number of servings of carbs in one serving by dividing the total carbs by 15. For example, if a food has 30 g of total " "carbs, it would be equal to 2 servings of carbs.  · Check the number of grams (g) of saturated and trans fats in one serving. Choose foods that have low or no amount of these fats.  · Check the number of milligrams (mg) of salt (sodium) in one serving. Most people should limit total sodium intake to less than 2,300 mg per day.  · Always check the nutrition information of foods labeled as \"low-fat\" or \"nonfat\". These foods may be higher in added sugar or refined carbs and should be avoided.  · Talk to your dietitian to identify your daily goals for nutrients listed on the label.  Shopping  · Avoid buying canned, premade, or processed foods. These foods tend to be high in fat, sodium, and added sugar.  · Shop around the outside edge of the grocery store. This includes fresh fruits and vegetables, bulk grains, fresh meats, and fresh dairy.  Cooking  · Use low-heat cooking methods, such as baking, instead of high-heat cooking methods like deep frying.  · Cook using healthy oils, such as olive, canola, or sunflower oil.  · Avoid cooking with butter, cream, or high-fat meats.  Meal planning  · Eat meals and snacks regularly, preferably at the same times every day. Avoid going long periods of time without eating.  · Eat foods high in fiber, such as fresh fruits, vegetables, beans, and whole grains. Talk to your dietitian about how many servings of carbs you can eat at each meal.  · Eat 4-6 ounces (oz) of lean protein each day, such as lean meat, chicken, fish, eggs, or tofu. One oz of lean protein is equal to:  ? 1 oz of meat, chicken, or fish.  ? 1 egg.  ? ¼ cup of tofu.  · Eat some foods each day that contain healthy fats, such as avocado, nuts, seeds, and fish.  Lifestyle  · Check your blood glucose regularly.  · Exercise regularly as told by your health care provider. This may include:  ? 150 minutes of moderate-intensity or vigorous-intensity exercise each week. This could be brisk walking, biking, or water " aerobics.  ? Stretching and doing strength exercises, such as yoga or weightlifting, at least 2 times a week.  · Take medicines as told by your health care provider.  · Do not use any products that contain nicotine or tobacco, such as cigarettes and e-cigarettes. If you need help quitting, ask your health care provider.  · Work with a counselor or diabetes educator to identify strategies to manage stress and any emotional and social challenges.  Questions to ask a health care provider  · Do I need to meet with a diabetes educator?  · Do I need to meet with a dietitian?  · What number can I call if I have questions?  · When are the best times to check my blood glucose?  Where to find more information:  · American Diabetes Association: diabetes.org  · Academy of Nutrition and Dietetics: www.eatright.org  · National [place] of Diabetes and Digestive and Kidney Diseases (NIH): www.niddk.nih.gov  Summary  · A healthy meal plan will help you control your blood glucose and maintain a healthy lifestyle.  · Working with a diet and nutrition specialist (dietitian) can help you make a meal plan that is best for you.  · Keep in mind that carbohydrates (carbs) and alcohol have immediate effects on your blood glucose levels. It is important to count carbs and to use alcohol carefully.  This information is not intended to replace advice given to you by your health care provider. Make sure you discuss any questions you have with your health care provider.  Document Released: 09/14/2006 Document Revised: 01/14/2020 Document Reviewed: 01/22/2018  [employer] Patient Education © 2020 [employer]

## 2020-04-20 DIAGNOSIS — F41.9 ANXIETY: ICD-10-CM

## 2020-04-20 RX ORDER — DESVENLAFAXINE SUCCINATE 50 MG/1
50 TABLET, EXTENDED RELEASE ORAL DAILY
Qty: 30 TABLET | OUTPATIENT
Start: 2020-04-20

## 2020-04-27 DIAGNOSIS — K21.9 GASTROESOPHAGEAL REFLUX DISEASE, ESOPHAGITIS PRESENCE NOT SPECIFIED: ICD-10-CM

## 2020-04-27 RX ORDER — OMEPRAZOLE 20 MG/1
20 CAPSULE, DELAYED RELEASE ORAL DAILY
Qty: 90 CAPSULE | Refills: 1 | Status: SHIPPED | OUTPATIENT
Start: 2020-04-27 | End: 2021-06-23

## 2020-07-08 ENCOUNTER — LAB (OUTPATIENT)
Dept: LAB | Facility: HOSPITAL | Age: 60
End: 2020-07-08

## 2020-07-08 ENCOUNTER — TELEPHONE (OUTPATIENT)
Dept: CARDIOLOGY | Facility: CLINIC | Age: 60
End: 2020-07-08

## 2020-07-08 ENCOUNTER — OFFICE VISIT (OUTPATIENT)
Dept: CARDIOLOGY | Facility: CLINIC | Age: 60
End: 2020-07-08

## 2020-07-08 VITALS
BODY MASS INDEX: 41.59 KG/M2 | SYSTOLIC BLOOD PRESSURE: 160 MMHG | DIASTOLIC BLOOD PRESSURE: 90 MMHG | WEIGHT: 265 LBS | HEART RATE: 75 BPM | OXYGEN SATURATION: 98 % | HEIGHT: 67 IN

## 2020-07-08 DIAGNOSIS — I77.810 DILATATION OF THORACIC AORTA (HCC): ICD-10-CM

## 2020-07-08 DIAGNOSIS — G47.33 OBSTRUCTIVE SLEEP APNEA SYNDROME: ICD-10-CM

## 2020-07-08 DIAGNOSIS — E78.2 MIXED HYPERLIPIDEMIA: ICD-10-CM

## 2020-07-08 DIAGNOSIS — R00.2 PALPITATIONS: ICD-10-CM

## 2020-07-08 DIAGNOSIS — I10 ESSENTIAL HYPERTENSION: ICD-10-CM

## 2020-07-08 DIAGNOSIS — E78.2 MIXED HYPERLIPIDEMIA: Primary | ICD-10-CM

## 2020-07-08 PROBLEM — E66.01 MORBID OBESITY WITH BMI OF 40.0-44.9, ADULT (HCC): Status: ACTIVE | Noted: 2020-07-08

## 2020-07-08 LAB
ALBUMIN SERPL-MCNC: 4.4 G/DL (ref 3.5–5.2)
ALBUMIN/GLOB SERPL: 1.1 G/DL
ALP SERPL-CCNC: 91 U/L (ref 39–117)
ALT SERPL W P-5'-P-CCNC: 44 U/L (ref 1–33)
ANION GAP SERPL CALCULATED.3IONS-SCNC: 10.7 MMOL/L (ref 5–15)
AST SERPL-CCNC: 35 U/L (ref 1–32)
BILIRUB SERPL-MCNC: 0.5 MG/DL (ref 0–1.2)
BUN SERPL-MCNC: 12 MG/DL (ref 6–20)
BUN/CREAT SERPL: 19.4 (ref 7–25)
CALCIUM SPEC-SCNC: 10.3 MG/DL (ref 8.6–10.5)
CHLORIDE SERPL-SCNC: 101 MMOL/L (ref 98–107)
CHOLEST SERPL-MCNC: 192 MG/DL (ref 0–200)
CO2 SERPL-SCNC: 26.3 MMOL/L (ref 22–29)
CREAT SERPL-MCNC: 0.62 MG/DL (ref 0.57–1)
GFR SERPL CREATININE-BSD FRML MDRD: 99 ML/MIN/1.73
GLOBULIN UR ELPH-MCNC: 4 GM/DL
GLUCOSE SERPL-MCNC: 176 MG/DL (ref 65–99)
HDLC SERPL-MCNC: 38 MG/DL (ref 40–60)
LDLC SERPL CALC-MCNC: 103 MG/DL (ref 0–100)
LDLC/HDLC SERPL: 2.72 {RATIO}
POTASSIUM SERPL-SCNC: 4 MMOL/L (ref 3.5–5.2)
PROT SERPL-MCNC: 8.4 G/DL (ref 6–8.5)
SODIUM SERPL-SCNC: 138 MMOL/L (ref 136–145)
TRIGL SERPL-MCNC: 253 MG/DL (ref 0–150)
VLDLC SERPL-MCNC: 50.6 MG/DL (ref 5–40)

## 2020-07-08 PROCEDURE — 36415 COLL VENOUS BLD VENIPUNCTURE: CPT

## 2020-07-08 PROCEDURE — 99204 OFFICE O/P NEW MOD 45 MIN: CPT | Performed by: INTERNAL MEDICINE

## 2020-07-08 PROCEDURE — 93000 ELECTROCARDIOGRAM COMPLETE: CPT | Performed by: INTERNAL MEDICINE

## 2020-07-08 PROCEDURE — 80053 COMPREHEN METABOLIC PANEL: CPT

## 2020-07-08 PROCEDURE — 80061 LIPID PANEL: CPT

## 2020-07-08 RX ORDER — ROSUVASTATIN CALCIUM 20 MG/1
10 TABLET, COATED ORAL DAILY
Qty: 30 TABLET | Refills: 3 | Status: SHIPPED | OUTPATIENT
Start: 2020-07-08 | End: 2020-10-28

## 2020-07-08 NOTE — PROGRESS NOTES
PATIENTINFORMATION    Date of Office Visit: 2020  Encounter Provider: Arnulfo Nice MD  Place of Service: [facility] CARDIOLOGY  Patient Name: Jemal Serrano  : 1960    Subjective:     Encounter Date:2020      Patient ID: Jemal Serrano is a 59 y.o. female.    Chief Complaint   Patient presents with   • Hyperlipidemia     NEW PATIENT      HPI  Ms. Serrano is a 59 years old female patient with past medical history of thoracic aortic aneurysm, hypertension, hyperlipidemia, obstructive sleep apnea and obesity, type II DM came to cardiology clinic to establish care.  She has prior history of intermittent palpitations that was evaluated with Holter monitor and did not reveal any significant arrhythmias.  For hypertension she was started on lisinopril at the beginning of the year but she developed severe symptomatic hypotension with systolic blood pressure dropping in the 70s and it was discontinued after that.  Today in the office her blood pressure is 140/92 on arrival and repeat blood pressure was 140/80.  It was also reported she has descending aortic aneurysm for which she follows up with thoracic surgeon at [facility] and her last follow-up was back in 2019 and she reports being small and she denied any chest pain today.  Patient denies any exertional or rest chest pain, shortness of breath.  No history of presyncope or syncope, orthopnea, PND or extremity swelling.  She has type 2 diabetes for which she takes metformin and linagliptin and she follows up with her PCP.  She has longstanding history of anxiety and on Pristiq for about 6 years.  She has obstructive sleep apnea on CPAP that she is compliant with.  She admits being not very compliant with her diet and does not exercise regularly.  Otherwise no significant complaints today    ROS   All systems reviewed and negative except as noted in HPI.    Past Medical History:   Diagnosis Date   •  Anxiety    • Aortic aneurysm (CMS/HCC)     followed by [facility], Dr. Rodas   • Jones esophagus    • Colon polyp 2016    tubular adenoma   • Depression    • Diabetes mellitus (CMS/HCC)    • Fatty liver    • GERD (gastroesophageal reflux disease)    • Hyperlipidemia    • Hypertension    • Melanoma (CMS/HCC)     s/p surgical removal, followed by [facility]   • Pyelonephritis    • Sleep apnea     compliant with CPAP   • Thyroid nodule    • Visual impairment     followed by Flo burch       Past Surgical History:   Procedure Laterality Date   • APPENDECTOMY     • BARIATRIC SURGERY     •  SECTION      x 2   • HYSTERECTOMY      Complete hysterectomy due to a large ovarian cyst and menorrhagia   • LAPAROSCOPIC APPENDECTOMY     • LUMBAR DISC SURGERY     • UMBILICAL HERNIA REPAIR         Social History     Socioeconomic History   • Marital status:      Spouse name: Not on file   • Number of children: Not on file   • Years of education: Not on file   • Highest education level: Not on file   Tobacco Use   • Smoking status: Never Smoker   • Smokeless tobacco: Never Used   Substance and Sexual Activity   • Alcohol use: No   • Drug use: Never   Social History Narrative    Works for [employer]. Lives with .        Family History   Problem Relation Age of Onset   • Irregular heart beat Mother    • Hypertension Father    • Breast cancer Sister    • Irregular heart beat Sister    • Leukemia Maternal Grandmother    • Heart attack Paternal Grandfather            ECG 12 Lead  Date/Time: 2020 10:29 AM  Performed by: Arnulfo Nice MD  Authorized by: Arnulfo Nice MD   Comparison: not compared with previous ECG   Rhythm: sinus rhythm  Rate: normal  Conduction: conduction normal  ST Segments: ST segments normal  T Waves: T waves normal  QRS axis: normal  Other: no other findings    Clinical impression: normal ECG               Objective:     /90   Pulse 75   Ht  "170.2 cm (67\")   Wt 120 kg (265 lb)   SpO2 98%   BMI 41.50 kg/m²  Body mass index is 41.5 kg/m².     Physical Exam   Constitutional: She is oriented to person, place, and time. She appears well-developed and well-nourished. No distress.   HENT:   Head: Normocephalic and atraumatic.   Eyes: Pupils are equal, round, and reactive to light. EOM are normal.   Neck: Normal range of motion. Neck supple. No thyromegaly present.   Cardiovascular: Normal rate, regular rhythm, normal heart sounds and intact distal pulses. Exam reveals no gallop and no friction rub.   No murmur heard.  Pulmonary/Chest: Effort normal and breath sounds normal. No respiratory distress. She has no wheezes. She has no rales. She exhibits no tenderness.   Abdominal: Soft. Bowel sounds are normal. She exhibits no distension. There is no guarding.   Musculoskeletal: Normal range of motion. She exhibits no edema or deformity.   Neurological: She is alert and oriented to person, place, and time. She has normal reflexes. No cranial nerve deficit.   Skin: Skin is warm and dry. No rash noted. She is not diaphoretic.   Psychiatric: She has a normal mood and affect. Judgment normal.       Review Of Data: I have obtained and reviewed documents from prior visits that [facility].      Assessment/Plan:             Dilatation of thoracic aorta (CMS/HCC)    Obstructive sleep apnea syndrome    Palpitations    Essential hypertension    Mixed hyperlipidemia    Get medical records from [facility] regarding the aortic aneurysm.  Patient had history of intolerance to lisinopril for hypertension.  I have advised to to keep blood pressure log and call back clinic.  If she is persistently hypertensive heart starting Coreg or some form of beta-blocker as she has underlying aortic aneurysm.  She would probably benefit from starting statin and prior to that I will check a CMP as she has history of elevated liver enzymes and she was told she has fatty liver disease.  I have " counseled patient to walk every day and modify her diet to lose weight.     Diagnosis and plan of care discussed with patient and verbalized understanding.           Arnulfo Nice MD  07/08/20  10:31

## 2020-07-08 NOTE — PROGRESS NOTES
Please notify patient that liver function tests has actually improved compared to prior study.  I have called in prescription for Crestor and she can pick it from pharmacy.  Thank you

## 2020-07-08 NOTE — TELEPHONE ENCOUNTER
Advised pt of good liver function and advised her to  Crestor from Pharmacy. Pt verbalized understanding.    SALMA Chin

## 2020-07-08 NOTE — TELEPHONE ENCOUNTER
----- Message from Arnulfo Nice MD sent at 7/8/2020  3:02 PM EDT -----  Please notify patient that liver function tests has actually improved compared to prior study.  I have called in prescription for Crestor and she can pick it from pharmacy.  Thank you

## 2020-07-09 ENCOUNTER — OFFICE VISIT (OUTPATIENT)
Dept: FAMILY MEDICINE CLINIC | Facility: CLINIC | Age: 60
End: 2020-07-09

## 2020-07-09 VITALS
RESPIRATION RATE: 18 BRPM | HEIGHT: 67 IN | DIASTOLIC BLOOD PRESSURE: 78 MMHG | OXYGEN SATURATION: 94 % | TEMPERATURE: 96.9 F | HEART RATE: 110 BPM | WEIGHT: 265 LBS | BODY MASS INDEX: 41.59 KG/M2 | SYSTOLIC BLOOD PRESSURE: 150 MMHG

## 2020-07-09 DIAGNOSIS — F33.1 MODERATE EPISODE OF RECURRENT MAJOR DEPRESSIVE DISORDER (HCC): ICD-10-CM

## 2020-07-09 DIAGNOSIS — M25.541 ARTHRALGIA OF BOTH HANDS: Primary | ICD-10-CM

## 2020-07-09 DIAGNOSIS — M25.542 ARTHRALGIA OF BOTH HANDS: Primary | ICD-10-CM

## 2020-07-09 PROCEDURE — 99213 OFFICE O/P EST LOW 20 MIN: CPT | Performed by: NURSE PRACTITIONER

## 2020-07-09 RX ORDER — DESVENLAFAXINE 100 MG/1
100 TABLET, EXTENDED RELEASE ORAL DAILY
Qty: 90 TABLET | Refills: 1 | Status: SHIPPED | OUTPATIENT
Start: 2020-07-09 | End: 2021-01-13 | Stop reason: SDUPTHER

## 2020-07-09 NOTE — PATIENT INSTRUCTIONS
I will call you with your lab results.   Please call with any questions or concerns.         Major Depressive Disorder, Adult  Major depressive disorder (MDD) is a mental health condition. MDD often makes you feel sad, hopeless, or helpless. MDD can also cause symptoms in your body. MDD can affect your:  · Work.  · School.  · Relationships.  · Other normal activities.  MDD can range from mild to very bad. It may occur once (single episode MDD). It can also occur many times (recurrent MDD).  The main symptoms of MDD often include:  · Feeling sad, depressed, or irritable most of the time.  · Loss of interest.  MDD symptoms also include:  · Sleeping too much or too little.  · Eating too much or too little.  · A change in your weight.  · Feeling tired (fatigue) or having low energy.  · Feeling worthless.  · Feeling guilty.  · Trouble making decisions.  · Trouble thinking clearly.  · Thoughts of suicide or harming others.  · Feeling weak.  · Feeling agitated.  · Keeping yourself from being around other people (isolation).  Follow these instructions at home:  Activity  · Do these things as told by your doctor:  ? Go back to your normal activities.  ? Exercise regularly.  ? Spend time outdoors.  Alcohol  · Talk with your doctor about how alcohol can affect your antidepressant medicines.  · Do not drink alcohol. Or, limit how much alcohol you drink.  ? This means no more than 1 drink a day for nonpregnant women and 2 drinks a day for men. One drink equals one of these:  § 12 oz of beer.  § 5 oz of wine.  § 1½ oz of hard liquor.  General instructions  · Take over-the-counter and prescription medicines only as told by your doctor.  · Eat a healthy diet.  · Get plenty of sleep.  · Find activities that you enjoy. Make time to do them.  · Think about joining a support group. Your doctor may be able to suggest a group for you.  · Keep all follow-up visits as told by your doctor. This is important.  Where to find more  information:  · National [place] on Mental Illness:  ? www.lenore.org  · [place] National [place] of Mental Health:  ? www.[employer].nih.gov  · National Suicide Prevention Lifeline:  ? 1-742-399-8754. This is free, 24-hour help.  Contact a doctor if:  · Your symptoms get worse.  · You have new symptoms.  Get help right away if:  · You self-harm.  · You see, hear, taste, smell, or feel things that are not present (hallucinate).  If you ever feel like you may hurt yourself or others, or have thoughts about taking your own life, get help right away. You can go to your nearest emergency department or call:  · Your local emergency services (911 in the [place]  · A suicide crisis helpline, such as the National Suicide Prevention Lifeline:  ? 5-834-025-1742. This is open 24 hours a day.  This information is not intended to replace advice given to you by your health care provider. Make sure you discuss any questions you have with your health care provider.  Document Released: 11/28/2016 Document Revised: 11/30/2018 Document Reviewed: 09/03/2017  Elsevier Patient Education © 2020 Elsevier Inc.

## 2020-07-09 NOTE — PROGRESS NOTES
"Subjective   Jemal Serrano is a 59 y.o. female.     History of Present Illness   Patient here for joint pain in hands, knee's and shoulders. She reports that pain has been going on for several months. She reports that it has gotten worse in the past month or so.  Pt reports that she spoke with her orthopedic office and was told to go to her PCP.  Pt is not currently taking any medication for her pain.     Pt also presents with increased anxiety depression would like to talk about increasing current medications. She is currently taking Pristiq 50mg 1 p.o. QD and has been taking this for about 6 years. Pt reports that the \"pandemic\" has increased her anxiety/depression. She denies any suicidal thoughts or planning and is not currently under the care of a counselor or psychiatrist.       The following portions of the patient's history were reviewed and updated as appropriate: allergies, current medications, past family history, past medical history, past social history, past surgical history and problem list.    Review of Systems   Constitutional: Negative.  Negative for chills, fatigue, fever, unexpected weight gain and unexpected weight loss.   Respiratory: Negative.  Negative for cough, chest tightness, shortness of breath and wheezing.    Cardiovascular: Negative.  Negative for chest pain, palpitations and leg swelling.   Musculoskeletal: Positive for arthralgias, joint swelling and myalgias.        Joint pain hands, knee's and shoulders    Neurological: Positive for dizziness (vertigo). Negative for headache.   Hematological: Negative.    Psychiatric/Behavioral: Positive for depressed mood. Negative for sleep disturbance and suicidal ideas. The patient is nervous/anxious.        Objective   Physical Exam    Vitals:    07/09/20 0858   BP: 150/78   Pulse: 110   Resp: 18   Temp: 96.9 °F (36.1 °C)   SpO2: 94%     Body mass index is 41.5 kg/m².    Procedures    Assessment/Plan   Problems Addressed this Visit        " Other    Depression    Relevant Medications    desvenlafaxine (Pristiq) 100 MG 24 hr tablet      Other Visit Diagnoses     Arthralgia of both hands    -  Primary    Relevant Orders    Rheumatoid Factor    C-reactive Protein    Uric Acid    ARELY        Return in about 2 weeks (around 7/23/2020) for Annual, Labs.              Patient Instructions   I will call you with your lab results.   Please call with any questions or concerns.         Major Depressive Disorder, Adult  Major depressive disorder (MDD) is a mental health condition. MDD often makes you feel sad, hopeless, or helpless. MDD can also cause symptoms in your body. MDD can affect your:  · Work.  · School.  · Relationships.  · Other normal activities.  MDD can range from mild to very bad. It may occur once (single episode MDD). It can also occur many times (recurrent MDD).  The main symptoms of MDD often include:  · Feeling sad, depressed, or irritable most of the time.  · Loss of interest.  MDD symptoms also include:  · Sleeping too much or too little.  · Eating too much or too little.  · A change in your weight.  · Feeling tired (fatigue) or having low energy.  · Feeling worthless.  · Feeling guilty.  · Trouble making decisions.  · Trouble thinking clearly.  · Thoughts of suicide or harming others.  · Feeling weak.  · Feeling agitated.  · Keeping yourself from being around other people (isolation).  Follow these instructions at home:  Activity  · Do these things as told by your doctor:  ? Go back to your normal activities.  ? Exercise regularly.  ? Spend time outdoors.  Alcohol  · Talk with your doctor about how alcohol can affect your antidepressant medicines.  · Do not drink alcohol. Or, limit how much alcohol you drink.  ? This means no more than 1 drink a day for nonpregnant women and 2 drinks a day for men. One drink equals one of these:  § 12 oz of beer.  § 5 oz of wine.  § 1½ oz of hard liquor.  General instructions  · Take over-the-counter and  prescription medicines only as told by your doctor.  · Eat a healthy diet.  · Get plenty of sleep.  · Find activities that you enjoy. Make time to do them.  · Think about joining a support group. Your doctor may be able to suggest a group for you.  · Keep all follow-up visits as told by your doctor. This is important.  Where to find more information:  · National [place] on Mental Illness:  ? www.lenore.org  · U.[place] National [place] of Mental Health:  ? www.[facility].nih.gov  · National Suicide Prevention Lifeline:  ? 1-850.152.8429. This is free, 24-hour help.  Contact a doctor if:  · Your symptoms get worse.  · You have new symptoms.  Get help right away if:  · You self-harm.  · You see, hear, taste, smell, or feel things that are not present (hallucinate).  If you ever feel like you may hurt yourself or others, or have thoughts about taking your own life, get help right away. You can go to your nearest emergency department or call:  · Your local emergency services (911 in the [place]  · A suicide crisis helpline, such as the National Suicide Prevention Lifeline:  ? 1-260.325.3238. This is open 24 hours a day.  This information is not intended to replace advice given to you by your health care provider. Make sure you discuss any questions you have with your health care provider.  Document Released: 11/28/2016 Document Revised: 11/30/2018 Document Reviewed: 09/03/2017  Elsevier Patient Education © 2020 Elsevier Inc.

## 2020-07-10 DIAGNOSIS — M25.541 ARTHRALGIA OF BOTH HANDS: Primary | ICD-10-CM

## 2020-07-10 DIAGNOSIS — M25.542 ARTHRALGIA OF BOTH HANDS: Primary | ICD-10-CM

## 2020-07-10 LAB
ANA SER QL: NEGATIVE
CRP SERPL-MCNC: 1.92 MG/DL (ref 0–0.5)
RHEUMATOID FACT SERPL-ACNC: 11.7 IU/ML (ref 0–13.9)
URATE SERPL-MCNC: 6.4 MG/DL (ref 2.4–5.7)

## 2020-07-10 RX ORDER — COLCHICINE 0.6 MG/1
0.6 TABLET ORAL DAILY
Qty: 3 TABLET | Refills: 0 | Status: SHIPPED | OUTPATIENT
Start: 2020-07-10 | End: 2020-10-28

## 2020-07-15 ENCOUNTER — TELEPHONE (OUTPATIENT)
Dept: FAMILY MEDICINE CLINIC | Facility: CLINIC | Age: 60
End: 2020-07-15

## 2020-07-15 NOTE — TELEPHONE ENCOUNTER
Patient called wanting a letter to work from home per Monse as long as patient wears a mask she will be ok to go in to work.  Patient informed and stated understanding

## 2020-07-16 ENCOUNTER — TELEPHONE (OUTPATIENT)
Dept: FAMILY MEDICINE CLINIC | Facility: CLINIC | Age: 60
End: 2020-07-16

## 2020-07-16 NOTE — TELEPHONE ENCOUNTER
PATIENT REQUESTING LETTER ON LETTERHEAD FROM THE DR THAT SHE IS BEING TREATED FOR DIABETES.    CAN MAIL TO PATIENT - [facility] ADDRESS ON FILE WAS CORRECT     PT CALL BACK # [phone]

## 2020-07-16 NOTE — TELEPHONE ENCOUNTER
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

## 2020-07-21 ENCOUNTER — TELEPHONE (OUTPATIENT)
Dept: FAMILY MEDICINE CLINIC | Facility: CLINIC | Age: 60
End: 2020-07-21

## 2020-07-21 DIAGNOSIS — E11.65 TYPE 2 DIABETES MELLITUS WITH HYPERGLYCEMIA, WITHOUT LONG-TERM CURRENT USE OF INSULIN (HCC): ICD-10-CM

## 2020-07-21 DIAGNOSIS — I10 ESSENTIAL HYPERTENSION: ICD-10-CM

## 2020-07-21 DIAGNOSIS — E78.5 HYPERLIPIDEMIA, UNSPECIFIED HYPERLIPIDEMIA TYPE: Primary | ICD-10-CM

## 2020-07-21 NOTE — TELEPHONE ENCOUNTER
Patient called to cancel her Physical and wanted to schedule Labs to then prep for her to reschedule the physical. Patient did not have active lab orders, so she wanted to request basic lab work, and then she would call to reschedule the Physical.    Please call and advise.  [phone]

## 2020-07-26 ENCOUNTER — RESULTS ENCOUNTER (OUTPATIENT)
Dept: FAMILY MEDICINE CLINIC | Facility: CLINIC | Age: 60
End: 2020-07-26

## 2020-07-26 DIAGNOSIS — E11.65 TYPE 2 DIABETES MELLITUS WITH HYPERGLYCEMIA, WITHOUT LONG-TERM CURRENT USE OF INSULIN (HCC): ICD-10-CM

## 2020-07-26 DIAGNOSIS — I10 ESSENTIAL HYPERTENSION: ICD-10-CM

## 2020-07-26 DIAGNOSIS — E78.5 HYPERLIPIDEMIA, UNSPECIFIED HYPERLIPIDEMIA TYPE: ICD-10-CM

## 2020-10-09 RX ORDER — LINAGLIPTIN 5 MG/1
TABLET, FILM COATED ORAL
Qty: 90 TABLET | Refills: 1 | Status: SHIPPED | OUTPATIENT
Start: 2020-10-09 | End: 2021-06-23

## 2020-10-28 ENCOUNTER — OFFICE VISIT (OUTPATIENT)
Dept: CARDIOLOGY | Facility: CLINIC | Age: 60
End: 2020-10-28

## 2020-10-28 VITALS
BODY MASS INDEX: 41.12 KG/M2 | WEIGHT: 262 LBS | OXYGEN SATURATION: 98 % | HEIGHT: 67 IN | HEART RATE: 77 BPM | DIASTOLIC BLOOD PRESSURE: 90 MMHG | SYSTOLIC BLOOD PRESSURE: 160 MMHG

## 2020-10-28 DIAGNOSIS — E78.00 PURE HYPERCHOLESTEROLEMIA: ICD-10-CM

## 2020-10-28 DIAGNOSIS — R06.02 EXERTIONAL SHORTNESS OF BREATH: Primary | ICD-10-CM

## 2020-10-28 DIAGNOSIS — E66.01 MORBID OBESITY WITH BMI OF 40.0-44.9, ADULT (HCC): ICD-10-CM

## 2020-10-28 DIAGNOSIS — I77.810 DILATATION OF THORACIC AORTA (HCC): ICD-10-CM

## 2020-10-28 DIAGNOSIS — I10 ESSENTIAL HYPERTENSION: ICD-10-CM

## 2020-10-28 DIAGNOSIS — R53.83 FATIGUE, UNSPECIFIED TYPE: ICD-10-CM

## 2020-10-28 PROCEDURE — 93000 ELECTROCARDIOGRAM COMPLETE: CPT | Performed by: INTERNAL MEDICINE

## 2020-10-28 PROCEDURE — 99214 OFFICE O/P EST MOD 30 MIN: CPT | Performed by: INTERNAL MEDICINE

## 2020-10-28 RX ORDER — LOSARTAN POTASSIUM 25 MG/1
25 TABLET ORAL DAILY
Qty: 90 TABLET | Refills: 3 | Status: SHIPPED | OUTPATIENT
Start: 2020-10-28 | End: 2021-06-23

## 2020-10-28 RX ORDER — ROSUVASTATIN CALCIUM 10 MG/1
10 TABLET, COATED ORAL DAILY
Qty: 90 TABLET | Refills: 3 | Status: SHIPPED | OUTPATIENT
Start: 2020-10-28 | End: 2021-06-23

## 2020-10-28 NOTE — PROGRESS NOTES
PATIENTINFORMATION    Date of Office Visit: 10/28/2020  Encounter Provider: Arnulfo Nice MD  Place of Service: [facility] CARDIOLOGY  Patient Name: Jemal Serrano  : 1960    Subjective:     Encounter Date:10/28/2020      Patient ID: Jemal Serrano is a 60 y.o. female.    Chief Complaint   Patient presents with   • Hyperlipidemia     3 months   • Diabetes   • Sleep Apnea     HPI  Ms. Serrano is a 60 years old female patient with past medical history of thoracic aortic aneurysm, hypertension, hyperlipidemia, obstructive sleep apnea, obesity, type II DM came to cardiology clinic for evaluation of abnormal EKG and fatigue.  I saw her last time in clinic first time when she came to establish care in 2020.  The aortic aneurysm is small and she has a regular follow-up with their thoracic surgeon at [facility].  At that time considering her overall risks I   wrote a prescription for  a statin but she did not start it for fear of getting muscle pains.  She admits she has not been active and mostly sedentary and does not exercise regularly.  Recently she has had progressively worsening fatigue that starts when she woke up in the morning and stays all day long and she would want to nap during daytime.  She reports being very compliant with her CPAP machine that she has done for 20 years and she follows up with her sleep doctor every year.  No adjustments were made in between.  She admits she gets more tired on exertion with some more shortness of breath but denied any significant chest discomfort, orthopnea, PND or extremity swelling.  She denied any palpitations presyncope or syncope.  She checks her blood pressure at home and systolic runs mostly in the 140s and diastolic in the 90s.  In the past when she was a started on lisinopril she reports her blood pressure dropped significantly.  She reports that her diabetic control is optimal.  No other significant  new complaints today    ROS   All systems reviewed and she complains of some right thigh and hip area pain otherwise negative review of systems except as noted in HPI.    Past Medical History:   Diagnosis Date   • Anxiety    • Aortic aneurysm (CMS/HCC)     followed by [facility], Dr. Rodas   • Jones esophagus    • Colon polyp 2016    tubular adenoma   • Depression    • Diabetes mellitus (CMS/HCC)    • Fatty liver    • GERD (gastroesophageal reflux disease)    • Hyperlipidemia    • Hypertension    • Melanoma (CMS/HCC)     s/p surgical removal, followed by [facility]   • Pyelonephritis    • Sleep apnea     compliant with CPAP   • Thyroid nodule    • Visual impairment     followed by Flo burch       Past Surgical History:   Procedure Laterality Date   • APPENDECTOMY     • BARIATRIC SURGERY     •  SECTION      x 2   • HYSTERECTOMY      Complete hysterectomy due to a large ovarian cyst and menorrhagia   • LAPAROSCOPIC APPENDECTOMY     • LUMBAR DISC SURGERY     • UMBILICAL HERNIA REPAIR         Social History     Socioeconomic History   • Marital status:      Spouse name: Not on file   • Number of children: Not on file   • Years of education: Not on file   • Highest education level: Not on file   Tobacco Use   • Smoking status: Never Smoker   • Smokeless tobacco: Never Used   Substance and Sexual Activity   • Alcohol use: No   • Drug use: Never   Social History Narrative    Works for [employer]. Lives with .        Family History   Problem Relation Age of Onset   • Irregular heart beat Mother    • Hypertension Father    • Breast cancer Sister    • Irregular heart beat Sister    • Leukemia Maternal Grandmother    • Heart attack Paternal Grandfather            ECG 12 Lead    Date/Time: 10/28/2020 3:08 PM  Performed by: Arnulfo Nice MD  Authorized by: Arnulfo Nice MD   Comparison: compared with previous ECG from 2020  Similar to previous ECG  Rhythm:  "sinus rhythm  Rate: normal  Conduction: conduction normal  ST Segments: ST segments normal  T Waves: T waves normal  QRS axis: normal  Other findings: non-specific ST-T wave changes    Clinical impression: abnormal EKG               Objective:     /90   Pulse 77   Ht 170.2 cm (67\")   Wt 119 kg (262 lb)   SpO2 98%   BMI 41.04 kg/m²  Body mass index is 41.04 kg/m².     Constitutional:       General: Not in acute distress.     Appearance: Well-developed. Not diaphoretic.   Eyes:      Pupils: Pupils are equal, round, and reactive to light.   HENT:      Head: Normocephalic and atraumatic.   Neck:      Musculoskeletal: Normal range of motion and neck supple.      Thyroid: No thyromegaly.   Pulmonary:      Effort: Pulmonary effort is normal. No respiratory distress.      Breath sounds: Normal breath sounds. No wheezing. No rales.   Chest:      Chest wall: Not tender to palpatation.   Cardiovascular:      Normal rate. Regular rhythm.      No gallop.   Pulses:     Intact distal pulses.   Edema:     Peripheral edema absent.   Abdominal:      General: Bowel sounds are normal. There is no distension.      Palpations: Abdomen is soft.      Tenderness: There is no guarding.   Musculoskeletal: Normal range of motion.         General: No deformity.   Skin:     General: Skin is warm and dry.      Findings: No rash.   Neurological:      Mental Status: Alert and oriented to person, place, and time.      Cranial Nerves: No cranial nerve deficit.      Deep Tendon Reflexes: Reflexes are normal and symmetric.   Psychiatric:         Judgment: Judgment normal.         Review Of Data: Reviewed documentations between.      Assessment/Plan:         Fatigue-suspect it is likely from underlying sleep apnea and she would probably need reevaluation by sleep medicine and have advised her to call them.    Dilatation of thoracic aorta (CMS/HCC)-small and she is following up with thoracic surgery    Essential hypertension-uncontrolled.  I " am starting her on losartan 25 mg p.o. daily and advised to keep blood pressure logs at home.    Obstructive sleep apnea syndrome-see plan above    Palpitations-resolved    Mixed hyperlipidemia-she is willing to try Crestor    Morbid obesity-encourage patient to increase level of activity and walk regularly and modify her diet and eat healthier to lose weight.    For now just get echocardiogram and evaluate her ventricular functions, defer stress testing for now.  May consider after she is seen by sleep medicine and with persistent symptoms.    Diagnosis and plan of care discussed with patient and verbalized understanding.           No diagnosis found.    Diagnosis and plan of care discussed with patient and verbalized understanding.           Arnulfo Nice MD  10/28/20  14:47 EDT

## 2020-11-30 ENCOUNTER — HOSPITAL ENCOUNTER (OUTPATIENT)
Dept: CARDIOLOGY | Facility: HOSPITAL | Age: 60
Discharge: HOME | End: 2020-11-30
Admitting: INTERNAL MEDICINE

## 2020-11-30 VITALS
HEIGHT: 67 IN | SYSTOLIC BLOOD PRESSURE: 160 MMHG | DIASTOLIC BLOOD PRESSURE: 90 MMHG | BODY MASS INDEX: 41.12 KG/M2 | WEIGHT: 262 LBS

## 2020-11-30 DIAGNOSIS — R06.02 EXERTIONAL SHORTNESS OF BREATH: ICD-10-CM

## 2020-11-30 LAB
AORTIC ARCH: 2.4 CM
ASCENDING AORTA: 3.3 CM
BH CV ECHO MEAS - ACS: 2.2 CM
BH CV ECHO MEAS - AO ARCH DIAM (PROXIMAL TRANS.): 2.4 CM
BH CV ECHO MEAS - AO MAX PG (FULL): 7.5 MMHG
BH CV ECHO MEAS - AO MAX PG: 11.1 MMHG
BH CV ECHO MEAS - AO MEAN PG (FULL): 4 MMHG
BH CV ECHO MEAS - AO MEAN PG: 6.1 MMHG
BH CV ECHO MEAS - AO ROOT AREA (BSA CORRECTED): 1.2
BH CV ECHO MEAS - AO ROOT AREA: 6.2 CM^2
BH CV ECHO MEAS - AO ROOT DIAM: 2.8 CM
BH CV ECHO MEAS - AO V2 MAX: 167 CM/SEC
BH CV ECHO MEAS - AO V2 MEAN: 115.6 CM/SEC
BH CV ECHO MEAS - AO V2 VTI: 33.3 CM
BH CV ECHO MEAS - ASC AORTA: 3.3 CM
BH CV ECHO MEAS - AVA(I,A): 2.4 CM^2
BH CV ECHO MEAS - AVA(I,D): 2.4 CM^2
BH CV ECHO MEAS - AVA(V,A): 2.2 CM^2
BH CV ECHO MEAS - AVA(V,D): 2.2 CM^2
BH CV ECHO MEAS - BSA(HAYCOCK): 2.4 M^2
BH CV ECHO MEAS - BSA: 2.3 M^2
BH CV ECHO MEAS - BZI_BMI: 41 KILOGRAMS/M^2
BH CV ECHO MEAS - BZI_METRIC_HEIGHT: 170.2 CM
BH CV ECHO MEAS - BZI_METRIC_WEIGHT: 118.8 KG
BH CV ECHO MEAS - EDV(MOD-SP2): 94 ML
BH CV ECHO MEAS - EDV(MOD-SP4): 75 ML
BH CV ECHO MEAS - EDV(TEICH): 132.8 ML
BH CV ECHO MEAS - EF(CUBED): 61.1 %
BH CV ECHO MEAS - EF(MOD-BP): 63 %
BH CV ECHO MEAS - EF(MOD-SP2): 62.8 %
BH CV ECHO MEAS - EF(MOD-SP4): 61.3 %
BH CV ECHO MEAS - EF(TEICH): 52.3 %
BH CV ECHO MEAS - ESV(MOD-SP2): 35 ML
BH CV ECHO MEAS - ESV(MOD-SP4): 29 ML
BH CV ECHO MEAS - ESV(TEICH): 63.4 ML
BH CV ECHO MEAS - FS: 27 %
BH CV ECHO MEAS - IVS/LVPW: 1.1
BH CV ECHO MEAS - IVSD: 1.3 CM
BH CV ECHO MEAS - LAT PEAK E' VEL: 8.4 CM/SEC
BH CV ECHO MEAS - LV DIASTOLIC VOL/BSA (35-75): 33.1 ML/M^2
BH CV ECHO MEAS - LV MASS(C)D: 256.5 GRAMS
BH CV ECHO MEAS - LV MASS(C)DI: 113.1 GRAMS/M^2
BH CV ECHO MEAS - LV MAX PG: 3.6 MMHG
BH CV ECHO MEAS - LV MEAN PG: 2.1 MMHG
BH CV ECHO MEAS - LV SYSTOLIC VOL/BSA (12-30): 12.8 ML/M^2
BH CV ECHO MEAS - LV V1 MAX: 95.1 CM/SEC
BH CV ECHO MEAS - LV V1 MEAN: 67.3 CM/SEC
BH CV ECHO MEAS - LV V1 VTI: 20.4 CM
BH CV ECHO MEAS - LVIDD: 5.3 CM
BH CV ECHO MEAS - LVIDS: 3.8 CM
BH CV ECHO MEAS - LVLD AP2: 8.7 CM
BH CV ECHO MEAS - LVLD AP4: 7.7 CM
BH CV ECHO MEAS - LVLS AP2: 7.2 CM
BH CV ECHO MEAS - LVLS AP4: 6.7 CM
BH CV ECHO MEAS - LVOT AREA (M): 3.8 CM^2
BH CV ECHO MEAS - LVOT AREA: 3.9 CM^2
BH CV ECHO MEAS - LVOT DIAM: 2.2 CM
BH CV ECHO MEAS - LVPWD: 1.2 CM
BH CV ECHO MEAS - MED PEAK E' VEL: 7.3 CM/SEC
BH CV ECHO MEAS - MV A DUR: 0.1 SEC
BH CV ECHO MEAS - MV A MAX VEL: 91.7 CM/SEC
BH CV ECHO MEAS - MV DEC SLOPE: 325.5 CM/SEC^2
BH CV ECHO MEAS - MV DEC TIME: 0.24 SEC
BH CV ECHO MEAS - MV E MAX VEL: 66.8 CM/SEC
BH CV ECHO MEAS - MV E/A: 0.73
BH CV ECHO MEAS - MV MAX PG: 5.1 MMHG
BH CV ECHO MEAS - MV MEAN PG: 2 MMHG
BH CV ECHO MEAS - MV P1/2T MAX VEL: 82.8 CM/SEC
BH CV ECHO MEAS - MV P1/2T: 74.5 MSEC
BH CV ECHO MEAS - MV V2 MAX: 112.7 CM/SEC
BH CV ECHO MEAS - MV V2 MEAN: 67.2 CM/SEC
BH CV ECHO MEAS - MV V2 VTI: 28.4 CM
BH CV ECHO MEAS - MVA P1/2T LCG: 2.7 CM^2
BH CV ECHO MEAS - MVA(P1/2T): 3 CM^2
BH CV ECHO MEAS - MVA(VTI): 2.8 CM^2
BH CV ECHO MEAS - PA MAX PG (FULL): 5.3 MMHG
BH CV ECHO MEAS - PA MAX PG: 6.7 MMHG
BH CV ECHO MEAS - PA V2 MAX: 129 CM/SEC
BH CV ECHO MEAS - PULM A REVS DUR: 0.11 SEC
BH CV ECHO MEAS - PULM A REVS VEL: 39.5 CM/SEC
BH CV ECHO MEAS - PULM DIAS VEL: 36.2 CM/SEC
BH CV ECHO MEAS - PULM S/D: 1.8
BH CV ECHO MEAS - PULM SYS VEL: 65.7 CM/SEC
BH CV ECHO MEAS - PVA(V,A): 1.8 CM^2
BH CV ECHO MEAS - PVA(V,D): 1.8 CM^2
BH CV ECHO MEAS - QP/QS: 0.69
BH CV ECHO MEAS - RV MAX PG: 1.3 MMHG
BH CV ECHO MEAS - RV MEAN PG: 0.88 MMHG
BH CV ECHO MEAS - RV V1 MAX: 57.8 CM/SEC
BH CV ECHO MEAS - RV V1 MEAN: 45.3 CM/SEC
BH CV ECHO MEAS - RV V1 VTI: 14.1 CM
BH CV ECHO MEAS - RVOT AREA: 3.9 CM^2
BH CV ECHO MEAS - RVOT DIAM: 2.2 CM
BH CV ECHO MEAS - SI(AO): 91.6 ML/M^2
BH CV ECHO MEAS - SI(CUBED): 39.2 ML/M^2
BH CV ECHO MEAS - SI(LVOT): 35.1 ML/M^2
BH CV ECHO MEAS - SI(MOD-SP2): 26 ML/M^2
BH CV ECHO MEAS - SI(MOD-SP4): 20.3 ML/M^2
BH CV ECHO MEAS - SI(TEICH): 30.6 ML/M^2
BH CV ECHO MEAS - SUP REN AO DIAM: 1.8 CM
BH CV ECHO MEAS - SV(AO): 207.7 ML
BH CV ECHO MEAS - SV(CUBED): 88.8 ML
BH CV ECHO MEAS - SV(LVOT): 79.7 ML
BH CV ECHO MEAS - SV(MOD-SP2): 59 ML
BH CV ECHO MEAS - SV(MOD-SP4): 46 ML
BH CV ECHO MEAS - SV(RVOT): 55.1 ML
BH CV ECHO MEAS - SV(TEICH): 69.4 ML
BH CV ECHO MEAS - TAPSE (>1.6): 2.5 CM
BH CV ECHO MEASUREMENTS AVERAGE E/E' RATIO: 8.51
BH CV XLRA - TDI S': 13.8 CM/SEC
LEFT ATRIUM VOLUME INDEX: 18 ML/M2
LEFT ATRIUM VOLUME: 39 CM3
MAXIMAL PREDICTED HEART RATE: 161 BPM
SINUS: 3 CM
STJ: 3.2 CM
STRESS TARGET HR: 137 BPM

## 2020-11-30 PROCEDURE — 93306 TTE W/DOPPLER COMPLETE: CPT

## 2020-11-30 PROCEDURE — 93306 TTE W/DOPPLER COMPLETE: CPT | Performed by: INTERNAL MEDICINE

## 2020-12-01 NOTE — PROGRESS NOTES
Patient was made aware of echocardiogram results.  She reports that she has no longer experiencing shortness of breath with exertion.  Reports that she is actively losing weight and that is causing improvement of her symptoms.

## 2020-12-29 ENCOUNTER — APPOINTMENT (OUTPATIENT)
Dept: WOMENS IMAGING | Facility: HOSPITAL | Age: 60
End: 2020-12-29

## 2020-12-29 PROCEDURE — 77063 BREAST TOMOSYNTHESIS BI: CPT | Performed by: RADIOLOGY

## 2020-12-29 PROCEDURE — 77067 SCR MAMMO BI INCL CAD: CPT | Performed by: RADIOLOGY

## 2021-01-13 RX ORDER — DESVENLAFAXINE 100 MG/1
100 TABLET, EXTENDED RELEASE ORAL DAILY
Qty: 90 TABLET | Refills: 0 | Status: SHIPPED | OUTPATIENT
Start: 2021-01-13 | End: 2021-04-09

## 2021-01-13 NOTE — TELEPHONE ENCOUNTER
Last seen 7/2020  And she has an appt scheduled with a different provider on 1/18/2021 so I only put #30 on refill instead of 90

## 2021-03-26 ENCOUNTER — BULK ORDERING (OUTPATIENT)
Dept: CASE MANAGEMENT | Facility: OTHER | Age: 61
End: 2021-03-26

## 2021-03-26 DIAGNOSIS — Z23 IMMUNIZATION DUE: ICD-10-CM

## 2021-04-09 RX ORDER — DESVENLAFAXINE 100 MG/1
100 TABLET, EXTENDED RELEASE ORAL DAILY
Qty: 90 TABLET | Refills: 0 | Status: SHIPPED | OUTPATIENT
Start: 2021-04-09 | End: 2021-07-15

## 2021-06-23 ENCOUNTER — OFFICE VISIT (OUTPATIENT)
Dept: ORTHOPEDIC SURGERY | Facility: CLINIC | Age: 61
End: 2021-06-23

## 2021-06-23 VITALS — BODY MASS INDEX: 43.39 KG/M2 | HEIGHT: 66 IN | WEIGHT: 270 LBS | TEMPERATURE: 96.8 F

## 2021-06-23 DIAGNOSIS — M71.50 TRAUMATIC BURSITIS: ICD-10-CM
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
## 2021-06-23 DIAGNOSIS — M17.12 ARTHRITIS OF LEFT KNEE: Primary | ICD-10-CM

## 2021-06-23 PROCEDURE — 99214 OFFICE O/P EST MOD 30 MIN: CPT | Performed by: ORTHOPAEDIC SURGERY

## 2021-06-23 RX ORDER — IBUPROFEN 200 MG
200 TABLET ORAL EVERY 6 HOURS PRN
COMMUNITY

## 2021-06-23 NOTE — PROGRESS NOTES
Patient Name: Jemal Serrano   YOB: 1960  Referring Primary Care Physician: Monse Jacques APRN  BMI: Body mass index is 43.58 kg/m².    Chief Complaint:    Chief Complaint   Patient presents with   • Left Knee - Initial Evaluation, Pain, Edema        HPI:     Jemal Serrano is a 60 y.o. female who presents today for evaluation of   Chief Complaint   Patient presents with   • Left Knee - Initial Evaluation, Pain, Edema   . Ms. Serrano presents today for a new problem of left knee pain. She fell a week ago and went to the [facility] ER and was told to follow up with orthopedics. The patient states that she fell going up the stairs in her garage. She caught her shoe on the step and went straight down on her knee. She denies any pain prior to the fall. She denies taking a blood thinner or aspirin.     The patient notes that she had a rash on her leg after being in [place], but they gave her some cream for that and that has gotten better.       Subjective   Medications:   Home Medications:  Current Outpatient Medications on File Prior to Visit   Medication Sig   • desvenlafaxine (PRISTIQ) 100 MG 24 hr tablet Take 1 tablet by mouth Daily. Please Schedule Appointment for Next Refill   • ibuprofen (ADVIL,MOTRIN) 200 MG tablet Take 200 mg by mouth Every 6 (Six) Hours As Needed for Mild Pain .   • [DISCONTINUED] losartan (Cozaar) 25 MG tablet Take 1 tablet by mouth Daily.   • [DISCONTINUED] metFORMIN (GLUCOPHAGE) 1000 MG tablet Take 1 tablet by mouth 2 (Two) Times a Day With Meals.   • [DISCONTINUED] omeprazole (priLOSEC) 20 MG capsule TAKE 1 CAPSULE BY MOUTH DAILY   • [DISCONTINUED] rosuvastatin (CRESTOR) 10 MG tablet Take 1 tablet by mouth Daily.   • [DISCONTINUED] Tradjenta 5 MG tablet tablet TAKE 1 TABLET BY MOUTH DAILY     No current facility-administered medications on file prior to visit.     Current Medications:  Scheduled Meds:  Continuous Infusions:No current facility-administered medications  for this visit.    PRN Meds:.    I have reviewed the patient's medical history in detail and updated the computerized patient record.  Review and summarization of old records includes:    Past Medical History:   Diagnosis Date   • Anxiety    • Aortic aneurysm (CMS/HCC)     followed by [facility], Dr. Rodas   • Jones esophagus    • Colon polyp 2016    tubular adenoma   • Depression    • Diabetes mellitus (CMS/HCC)    • Fatty liver    • GERD (gastroesophageal reflux disease)    • Hyperlipidemia    • Hypertension    • Melanoma (CMS/HCC)     s/p surgical removal, followed by [facility]   • Pyelonephritis    • Sleep apnea     compliant with CPAP   • Thyroid nodule    • Visual impairment     followed by [facility]        Past Surgical History:   Procedure Laterality Date   • APPENDECTOMY     • BARIATRIC SURGERY     •  SECTION      x 2   • HYSTERECTOMY      Complete hysterectomy due to a large ovarian cyst and menorrhagia   • LAPAROSCOPIC APPENDECTOMY     • LUMBAR DISC SURGERY     • UMBILICAL HERNIA REPAIR          Social History     Occupational History   • Not on file   Tobacco Use   • Smoking status: Never Smoker   • Smokeless tobacco: Never Used   Substance and Sexual Activity   • Alcohol use: No   • Drug use: Never   • Sexual activity: Not on file      Social History     Social History Narrative    Works for [employer]. Lives with .         Family History   Problem Relation Age of Onset   • Irregular heart beat Mother    • Hypertension Father    • Breast cancer Sister    • Irregular heart beat Sister    • Leukemia Maternal Grandmother    • Heart attack Paternal Grandfather        ROS: 14 point review of systems was performed and all other systems were reviewed and are negative except for documented findings in HPI and today's encounter.     Allergies:   Allergies   Allergen Reactions   • Adhesive Tape Rash   • Codeine Nausea And Vomiting   • Latex Rash   • Sulfa Antibiotics  "Itching     Constitutional:  Denies fever, shaking or chills   Eyes:  Denies change in visual acuity   HENT:  Denies nasal congestion or sore throat   Respiratory:  Denies cough or shortness of breath   Cardiovascular:  Denies chest pain or severe LE edema   GI:  Denies abdominal pain, nausea, vomiting, bloody stools or diarrhea   Musculoskeletal:  Numbness, tingling, pain, or loss of motor function only as noted above in history of present illness.  : Denies painful urination or hematuria  Integument:  Denies rash, lesion or ulceration   Neurologic:  Denies headache or focal weakness  Endocrine:  Denies lymphadenopathy  Psych:  Denies confusion or change in mental status   Hem:  Denies active bleeding    OBJECTIVE:  Physical Exam: 60 y.o. female  Wt Readings from Last 3 Encounters:   06/23/21 122 kg (270 lb)   11/30/20 119 kg (262 lb)   10/28/20 119 kg (262 lb)     Ht Readings from Last 1 Encounters:   06/23/21 167.6 cm (66\")     Body mass index is 43.58 kg/m².  Vitals:    06/23/21 1519   Temp: 96.8 °F (36 °C)     Vital signs reviewed.     General Appearance:    Alert, cooperative, in no acute distress                  Eyes: conjunctiva clear  ENT: external ears and nose atraumatic  CV: no peripheral edema  Resp: normal respiratory effort  Skin: no rashes or wounds; normal turgor  Psych: mood and affect appropriate  Lymph: no nodes appreciated  Neuro: gross sensation intact  Vascular:  Palpable peripheral pulse in noted extremity  Musculoskeletal Extremities: Examination of the left knee today shows some bruising anteriorly. She presented to me a picture in her phone that shows quite a bit of bruising in the area right after the fall. She denies having been on blood thinners. She does have what appears to be a swollen bruise in the prepatellar bursa. Also has some effusion noted within the knee. I do not feel a Baker 's cyst. She is able to transfer and walk fairly well despite the swelling noted in her leg. She " has some swelling and some bruising below. She had a rash on her leg after being in [place], but they gave her some cream for that and that has gotten better. On exam today, she has obvious swelling in the anterior knee that appears to be mainly in the bursa, some below perhaps in the joint line. I do not feel a defect on her patellar quad tendon. She is able to rise from a chair and walk without difficulty, and she has good ligamentous stability.    Radiology: AP lateral sunrise left knee taken outside with copies provided did not appear to be weightbearing show least moderate arthritic changes seen obvious fracture          Assessment:     ICD-10-CM ICD-9-CM   1. Arthritis of left knee  M17.12 716.96   2. Fall from slip, trip, or stumble, initial encounter  W01.0XXA E885.9   3. Traumatic bursitis  M71.50 727.3        MDM/Plan:   The diagnosis(es), natural history, pathophysiology and treatment for diagnosis(es) were discussed. Opportunity given and questions answered.  Biomechanics of pertinent body areas discussed.  When appropriate, the use of ambulatory aids discussed.    We discussed the nature of the patient's left knee pain. She will follow up p.r.n. I think it is more coming from her traumatic bursitis rather than the arthritis part of her knee and certainly if she wanted we could try an injection in her in the knee or an aspiration of the    BMI:  The concept of BMI body mass index and its importance and implications discussed.    EXERCISES:  Advice on benefits of, and types of regular/moderate exercise pertaining to orthopedic diagnosis(es).  Inflammation/pain control; with cold, heat, elevation and/or liniments discussed as appropriate  MEDICAL RECORDS reviewed from other provider(s) for past and current medical history pertinent to this complaint.   bursitis however she does not want either at this point is is trying to get better gave her advice and see her back as necessary    6/23/2021    Scribed for  Natalio Martin MD by Shayan Sauceda.  06/23/21   17:35 EDT    I have personally performed the services described in this document as scribed by the above individual, and it is both accurate and complete.  Natalio Martin MD  6/24/2021  12:00 EDT

## 2021-07-15 RX ORDER — DESVENLAFAXINE 100 MG/1
TABLET, EXTENDED RELEASE ORAL
Qty: 30 TABLET | Refills: 0 | Status: SHIPPED | OUTPATIENT
Start: 2021-07-15 | End: 2022-09-14 | Stop reason: ALTCHOICE

## 2022-07-22 ENCOUNTER — APPOINTMENT (OUTPATIENT)
Dept: WOMENS IMAGING | Facility: HOSPITAL | Age: 62
End: 2022-07-22

## 2022-07-22 PROCEDURE — 77067 SCR MAMMO BI INCL CAD: CPT | Performed by: RADIOLOGY

## 2022-07-22 PROCEDURE — 77063 BREAST TOMOSYNTHESIS BI: CPT | Performed by: RADIOLOGY

## 2022-08-22 ENCOUNTER — TELEPHONE (OUTPATIENT)
Dept: CARDIOLOGY | Facility: CLINIC | Age: 62
End: 2022-08-22

## 2022-09-14 ENCOUNTER — OFFICE VISIT (OUTPATIENT)
Dept: CARDIOLOGY | Facility: CLINIC | Age: 62
End: 2022-09-14

## 2022-09-14 VITALS
DIASTOLIC BLOOD PRESSURE: 70 MMHG | HEIGHT: 66 IN | BODY MASS INDEX: 39.53 KG/M2 | WEIGHT: 246 LBS | OXYGEN SATURATION: 94 % | SYSTOLIC BLOOD PRESSURE: 160 MMHG | HEART RATE: 76 BPM

## 2022-09-14 DIAGNOSIS — I10 ESSENTIAL HYPERTENSION: Primary | ICD-10-CM

## 2022-09-14 DIAGNOSIS — G47.33 OBSTRUCTIVE SLEEP APNEA SYNDROME: ICD-10-CM

## 2022-09-14 DIAGNOSIS — R00.2 PALPITATIONS: ICD-10-CM

## 2022-09-14 DIAGNOSIS — D86.9 SARCOIDOSIS: ICD-10-CM

## 2022-09-14 PROCEDURE — 93000 ELECTROCARDIOGRAM COMPLETE: CPT | Performed by: NURSE PRACTITIONER

## 2022-09-14 PROCEDURE — 99214 OFFICE O/P EST MOD 30 MIN: CPT | Performed by: NURSE PRACTITIONER

## 2022-09-14 RX ORDER — DULOXETIN HYDROCHLORIDE 60 MG/1
CAPSULE, DELAYED RELEASE ORAL
COMMUNITY
Start: 2022-08-29

## 2022-09-14 RX ORDER — PREDNISONE 20 MG/1
TABLET ORAL
COMMUNITY
Start: 2022-08-26

## 2022-09-14 RX ORDER — ROSUVASTATIN CALCIUM 20 MG/1
TABLET, COATED ORAL
COMMUNITY
Start: 2022-07-05

## 2022-09-14 RX ORDER — METFORMIN HYDROCHLORIDE 500 MG/1
TABLET, EXTENDED RELEASE ORAL
COMMUNITY
Start: 2022-07-12

## 2022-09-14 NOTE — PROGRESS NOTES
[facility] Follow Up Office Note     Encounter Date:22  Patient:Jemal Serrano  :1960  MRN:[id number]      Chief Complaint:   Chief Complaint   Patient presents with   • Palpitations         History of Presenting Illness:        Jemal Serrano is a 61 y.o. female who is here for follow-up of palpitations.  She is a patient of Dr Nice.    Patient has known thoracic aortic aneurysm followed at [facility] with thoracic surgery, hypertension, hyperlipidemia, obstructive sleep apnea on CPAP, obesity, diabetes type 2.  She was last seen by Dr. Nice in 2020 for abnormal EKG and fatigue.  He suspected sleep apnea was contributing to her symptoms and suggested re-evaluation by sleep medicine.  Additionally her blood pressure was not well controlled and she was started on 25 mg of losartan daily.  A repeat echocardiogram showed normal LVEF, grade 1 diastolic dysfunction and no significant valvular abnormalities.    Patient was recently diagnosed with Sarcoidosis and started on steroids, she is seeing Dr. Wilks with pulmonary.  She will also be seen by Rheumatology and at [facility] in the future.    Patient reports she has had palpitations in the past but she now experiences them more frequently. She feels skipped beats in her chest that occur daily, coming on randomly. She denies associated symptoms including shortness of breath and dizziness. She denies chest pain, orthopnea. She has noted a little more shortness of breath with activity that she attributes to Sarcoid. She has mild SJ that comes on throughout the day and resolves overnight.  She is compliant with CPAP. She reports sleeping poorly after just starting steroids.    Review of Systems:  Review of Systems   Cardiovascular: Positive for dyspnea on exertion and palpitations. Negative for leg swelling and orthopnea.   Respiratory: Negative for shortness of breath.        Current Outpatient Medications  on File Prior to Visit   Medication Sig Dispense Refill   • DULoxetine (CYMBALTA) 60 MG capsule      • ibuprofen (ADVIL,MOTRIN) 200 MG tablet Take 200 mg by mouth Every 6 (Six) Hours As Needed for Mild Pain .     • metFORMIN ER (GLUCOPHAGE-XR) 500 MG 24 hr tablet      • predniSONE (DELTASONE) 20 MG tablet      • rosuvastatin (CRESTOR) 20 MG tablet      • SITagliptin (JANUVIA) 100 MG tablet      • [DISCONTINUED] desvenlafaxine (PRISTIQ) 100 MG 24 hr tablet TAKE 1 TABLET BY MOUTH DAILY 30 tablet 0     No current facility-administered medications on file prior to visit.       Allergies   Allergen Reactions   • Adhesive Tape Rash   • Codeine Nausea And Vomiting   • Latex Rash   • Sulfa Antibiotics Itching       Past Medical History:   Diagnosis Date   • Anxiety    • Aortic aneurysm (HCC)     followed by [facility], Dr. Rodas   • Jones esophagus    • Colon polyp 2016    tubular adenoma   • Depression    • Diabetes mellitus (HCC)    • Fatty liver    • GERD (gastroesophageal reflux disease)    • Hyperlipidemia    • Hypertension    • Melanoma (HCC)     s/p surgical removal, followed by [facility]   • Pyelonephritis    • Sleep apnea     compliant with CPAP   • Thyroid nodule    • Visual impairment     followed by [facility]       Past Surgical History:   Procedure Laterality Date   • APPENDECTOMY     • BARIATRIC SURGERY     •  SECTION      x 2   • HYSTERECTOMY      Complete hysterectomy due to a large ovarian cyst and menorrhagia   • LAPAROSCOPIC APPENDECTOMY     • LUMBAR DISC SURGERY     • UMBILICAL HERNIA REPAIR         Social History     Socioeconomic History   • Marital status:    Tobacco Use   • Smoking status: Never Smoker   • Smokeless tobacco: Never Used   Substance and Sexual Activity   • Alcohol use: No   • Drug use: Never       Family History   Problem Relation Age of Onset   • Irregular heart beat Mother    • Hypertension Father    • Breast cancer Sister    • Irregular  "heart beat Sister    • Leukemia Maternal Grandmother    • Heart attack Paternal Grandfather        The following portions of the patient's history were reviewed and updated as appropriate: allergies, current medications, past family history, past medical history, past social history, past surgical history and problem list.       Objective:       Vitals:    09/14/22 1148   BP: 160/70   BP Location: Left arm   Patient Position: Sitting   Cuff Size: Adult   Pulse: 76   SpO2: 94%   Weight: 112 kg (246 lb)   Height: 167.6 cm (66\")         Physical Exam:  Constitutional: Well appearing, well developed, no acute distress   HENT: Oropharynx clear and membrane moist  Eyes: Normal conjunctiva, no sclera icterus  Neck: Supple, no carotid bruit bilaterally  Cardiovascular: Regular rate and rhythm, No Murmur, No bilateral lower extremity edema  Pulmonary: Normal respiratory effort, normal lung sounds, no wheezing  Neurological: Alert and orient x 3  Skin: Warm, dry, no ecchymosis, no rash  Psych: Appropriate mood and affect. Normal judgment and insight         Lab Results   Component Value Date     07/08/2020     (L) 08/21/2019    K 4.0 07/08/2020    K 4.1 08/21/2019     07/08/2020    CL 98 08/21/2019    CO2 26.3 07/08/2020    CO2 26 08/21/2019    BUN 12 07/08/2020    BUN 8 08/21/2019    CREATININE 0.62 07/08/2020    CREATININE 0.5 (L) 08/21/2019    EGFRIFNONA 99 07/08/2020    EGFRIFNONA 83 04/16/2019    EGFRIFAFRI 101 04/16/2019    EGFRIFAFRI 114 02/25/2019    GLUCOSE 176 (H) 07/08/2020    GLUCOSE 181 (H) 04/16/2019    CALCIUM 10.3 07/08/2020    CALCIUM 9.2 08/21/2019    PROTENTOTREF 7.9 04/16/2019    PROTENTOTREF 7.7 02/25/2019    ALBUMIN 4.40 07/08/2020    ALBUMIN 4.2 08/21/2019    BILITOT 0.5 07/08/2020    BILITOT 1.6 (H) 08/21/2019    AST 35 (H) 07/08/2020    AST 99 (H) 08/21/2019    ALT 44 (H) 07/08/2020     (H) 08/21/2019     Lab Results   Component Value Date    WBC 9.71 07/25/2022    WBC 9.90 " 07/06/2022    HGB 12.9 07/25/2022    HGB 12.6 07/06/2022    HCT 41.9 07/25/2022    HCT 39.2 07/06/2022    MCV 89.3 07/25/2022    MCV 88.7 07/06/2022     07/25/2022     07/06/2022     Lab Results   Component Value Date    CHOL 192 07/08/2020    TRIG 253 (H) 07/08/2020    TRIG 103 02/25/2019    HDL 38 (L) 07/08/2020    HDL 44 02/25/2019     (H) 07/08/2020     (H) 02/25/2019     No results found for: PROBNP, BNP  Lab Results   Component Value Date    TROPONINI <0.012 08/21/2019     Lab Results   Component Value Date    TSH 1.330 02/25/2019           ECG 12 Lead    Date/Time: 9/14/2022 12:08 PM  Performed by: Cassy Mario APRN  Authorized by: Cassy Mario APRN   Comparison: compared with previous ECG from 10/28/2020  Similar to previous ECG  Rhythm: sinus rhythm  Rate: normal  ST Segments: ST segments normal               Assessment:          Diagnosis Plan   1. Essential hypertension  ECG 12 Lead    Adult Transthoracic Echo Complete w/ Color, Spectral and Contrast if Necessary Per Protocol    Holter Monitor - 48 Hour   2. Palpitations     3. Obstructive sleep apnea syndrome     4. Sarcoidosis            Plan:       Palpitations - feels skipped beats, no other associated symptoms. Increasing in frequency. Ordered repeat echo and 48 hour Holter. Recommend increased activity, limit drinking and caffeine    Hypertension - BP is elevated today. May be higher with recent start of steroids. Will keep twice daily log for one week and report results.  Will likely need to start medication. She has previously taken lisinopril and losartan    Thoracic aortic aneurysm - followed by Vascular at [facility]    JASON - complaint with CPAP    Sarcoidosis - on prednisone. Followed by Pulmonary and will be seeing Rheumatology. Plans to have evaluation at [place] next year. Echo ordered with recent increase in palpitations, dyspnea    Patient is seen today for follow-up.  Her largest concern is  palpitations which have become more frequent in the last month or so.  She has no other associated symptoms.  48-hour Holter and echo ordered.  Additionally, she has a recent diagnosis of pulmonary sarcoidosis.  She has been started on steroids.  Her blood pressure is elevated in clinic today.  She will keep a log for next week but likely will need to start on antihypertensive medication.  I will contact her with results and with further recommendations.  She will follow-up in 6 months with Dr. Nice.    Orders Placed This Encounter   Procedures   • Holter Monitor - 48 Hour     Standing Status:   Future     Standing Expiration Date:   9/14/2023     Order Specific Question:   Reason for exam?     Answer:   Palpitations     Order Specific Question:   Release to patient     Answer:   Routine Release   • ECG 12 Lead     This order was created via procedure documentation     Order Specific Question:   Release to patient     Answer:   Routine Release   • Adult Transthoracic Echo Complete w/ Color, Spectral and Contrast if Necessary Per Protocol     Standing Status:   Future     Standing Expiration Date:   9/14/2023     Order Specific Question:   Reason for exam?     Answer:   Palpitations     Order Specific Question:   Release to patient     Answer:   Routine Release            STEPHON Lechuga  [facility]  09/14/22  12:40 EDT

## 2022-09-23 ENCOUNTER — TELEPHONE (OUTPATIENT)
Dept: CARDIOLOGY | Facility: CLINIC | Age: 62
End: 2022-09-23

## 2022-09-23 NOTE — TELEPHONE ENCOUNTER
Reviewed Cassy Mario's message with Jemal Serrano and she verbalized understanding.  Patient stated she will provide BP log in one week.    Thank you,  Zoya Montoya RN  Triage Nurse [facility]

## 2022-09-23 NOTE — TELEPHONE ENCOUNTER
----- Message from STEPHON Singh sent at 9/23/2022 10:37 AM EDT -----  Please let the patient know that her heart monitor was normal.  There were infrequent extra beats from the heart, her reported symptoms correlated with normal sinus rhythm.    Please check if she has been monitoring her blood pressure.    Thanks!

## 2022-09-23 NOTE — TELEPHONE ENCOUNTER
It is possible but hard to know if related to sarcoid.  Steroids can also sometimes give people some different symptoms.  The good news is the monitor is not concerning.    Please have her MyChart or call in a BP log in a week.    Thanks!

## 2022-09-23 NOTE — TELEPHONE ENCOUNTER
Reviewed results with Jemal Serrano and patient verbalized understanding of results.    Patient is asking if her symptoms could be related to her sarcoidosis?    Patient stated she has not been monitoring her blood pressure but plans are starting today.    Thank you,  Zoya Montoya RN  Triage Nurse [facility]

## 2022-09-23 NOTE — TELEPHONE ENCOUNTER
Called and left VM. Will continue to try to reach patient.     Amber Pablo RN  Triage [facility]

## 2022-10-03 ENCOUNTER — HOSPITAL ENCOUNTER (OUTPATIENT)
Dept: CARDIOLOGY | Facility: HOSPITAL | Age: 62
Discharge: HOME | End: 2022-10-03
Admitting: NURSE PRACTITIONER

## 2022-10-03 VITALS
BODY MASS INDEX: 39.53 KG/M2 | DIASTOLIC BLOOD PRESSURE: 60 MMHG | HEART RATE: 97 BPM | OXYGEN SATURATION: 97 % | WEIGHT: 246 LBS | HEIGHT: 66 IN | SYSTOLIC BLOOD PRESSURE: 118 MMHG

## 2022-10-03 DIAGNOSIS — I10 ESSENTIAL HYPERTENSION: ICD-10-CM

## 2022-10-03 LAB
AORTIC ARCH: 2.4 CM
ASCENDING AORTA: 3.3 CM
BH CV ECHO MEAS - ACS: 2.19 CM
BH CV ECHO MEAS - AO MAX PG: 13.6 MMHG
BH CV ECHO MEAS - AO MEAN PG: 8.6 MMHG
BH CV ECHO MEAS - AO ROOT DIAM: 3.3 CM
BH CV ECHO MEAS - AO V2 MAX: 184.6 CM/SEC
BH CV ECHO MEAS - AO V2 VTI: 34 CM
BH CV ECHO MEAS - AVA(I,D): 3.1 CM2
BH CV ECHO MEAS - EDV(CUBED): 98.1 ML
BH CV ECHO MEAS - EDV(MOD-SP2): 90 ML
BH CV ECHO MEAS - EDV(MOD-SP4): 94 ML
BH CV ECHO MEAS - EF(MOD-BP): 64.9 %
BH CV ECHO MEAS - EF(MOD-SP2): 60 %
BH CV ECHO MEAS - EF(MOD-SP4): 71.3 %
BH CV ECHO MEAS - ESV(CUBED): 24.6 ML
BH CV ECHO MEAS - ESV(MOD-SP2): 36 ML
BH CV ECHO MEAS - ESV(MOD-SP4): 27 ML
BH CV ECHO MEAS - FS: 37 %
BH CV ECHO MEAS - IVS/LVPW: 1.02 CM
BH CV ECHO MEAS - IVSD: 1.24 CM
BH CV ECHO MEAS - LAT PEAK E' VEL: 6.4 CM/SEC
BH CV ECHO MEAS - LV DIASTOLIC VOL/BSA (35-75): 43 CM2
BH CV ECHO MEAS - LV MASS(C)D: 213.8 GRAMS
BH CV ECHO MEAS - LV MAX PG: 5.5 MMHG
BH CV ECHO MEAS - LV MEAN PG: 3.8 MMHG
BH CV ECHO MEAS - LV SYSTOLIC VOL/BSA (12-30): 12.4 CM2
BH CV ECHO MEAS - LV V1 MAX: 117.1 CM/SEC
BH CV ECHO MEAS - LV V1 VTI: 26.7 CM
BH CV ECHO MEAS - LVIDD: 4.6 CM
BH CV ECHO MEAS - LVIDS: 2.9 CM
BH CV ECHO MEAS - LVOT AREA: 3.9 CM2
BH CV ECHO MEAS - LVOT DIAM: 2.23 CM
BH CV ECHO MEAS - LVPWD: 1.22 CM
BH CV ECHO MEAS - MED PEAK E' VEL: 7.6 CM/SEC
BH CV ECHO MEAS - MV A DUR: 0.11 SEC
BH CV ECHO MEAS - MV A MAX VEL: 91.7 CM/SEC
BH CV ECHO MEAS - MV DEC SLOPE: 362.5 CM/SEC2
BH CV ECHO MEAS - MV DEC TIME: 0.2 MSEC
BH CV ECHO MEAS - MV E MAX VEL: 69.5 CM/SEC
BH CV ECHO MEAS - MV E/A: 0.76
BH CV ECHO MEAS - MV MAX PG: 5.4 MMHG
BH CV ECHO MEAS - MV MEAN PG: 2.6 MMHG
BH CV ECHO MEAS - MV P1/2T: 60.3 MSEC
BH CV ECHO MEAS - MV V2 VTI: 28.2 CM
BH CV ECHO MEAS - MVA(P1/2T): 3.6 CM2
BH CV ECHO MEAS - MVA(VTI): 3.7 CM2
BH CV ECHO MEAS - PA ACC TIME: 0.08 SEC
BH CV ECHO MEAS - PA PR(ACCEL): 42.2 MMHG
BH CV ECHO MEAS - PA V2 MAX: 119.4 CM/SEC
BH CV ECHO MEAS - PULM A REVS DUR: 0.07 SEC
BH CV ECHO MEAS - PULM A REVS VEL: 25.7 CM/SEC
BH CV ECHO MEAS - PULM DIAS VEL: 39 CM/SEC
BH CV ECHO MEAS - PULM S/D: 1.31
BH CV ECHO MEAS - PULM SYS VEL: 51 CM/SEC
BH CV ECHO MEAS - QP/QS: 0.53
BH CV ECHO MEAS - RAP SYSTOLE: 3 MMHG
BH CV ECHO MEAS - RV MAX PG: 2.15 MMHG
BH CV ECHO MEAS - RV V1 MAX: 73.3 CM/SEC
BH CV ECHO MEAS - RV V1 VTI: 14.5 CM
BH CV ECHO MEAS - RVOT DIAM: 2.2 CM
BH CV ECHO MEAS - RVSP: 18.1 MMHG
BH CV ECHO MEAS - SI(MOD-SP2): 24.7 ML/M2
BH CV ECHO MEAS - SI(MOD-SP4): 30.7 ML/M2
BH CV ECHO MEAS - SUP REN AO DIAM: 2.5 CM
BH CV ECHO MEAS - SV(LVOT): 104.8 ML
BH CV ECHO MEAS - SV(MOD-SP2): 54 ML
BH CV ECHO MEAS - SV(MOD-SP4): 67 ML
BH CV ECHO MEAS - SV(RVOT): 55.2 ML
BH CV ECHO MEAS - TAPSE (>1.6): 2.6 CM
BH CV ECHO MEAS - TR MAX PG: 15.1 MMHG
BH CV ECHO MEAS - TR MAX VEL: 194.1 CM/SEC
BH CV ECHO MEASUREMENTS AVERAGE E/E' RATIO: 9.93
BH CV VAS BP RIGHT ARM: NORMAL MMHG
BH CV XLRA - RV BASE: 3.1 CM
BH CV XLRA - RV LENGTH: 9 CM
BH CV XLRA - RV MID: 2.8 CM
BH CV XLRA - TDI S': 17.6 CM/SEC
LEFT ATRIUM VOLUME INDEX: 20.5 ML/M2
LV EF 2D ECHO EST: 65 %
MAXIMAL PREDICTED HEART RATE: 160 BPM
SINUS: 3 CM
STJ: 2.9 CM
STRESS TARGET HR: 136 BPM

## 2022-10-03 PROCEDURE — 93306 TTE W/DOPPLER COMPLETE: CPT | Performed by: INTERNAL MEDICINE

## 2022-10-03 PROCEDURE — 25010000002 PERFLUTREN (DEFINITY) 8.476 MG IN SODIUM CHLORIDE (PF) 0.9 % 10 ML INJECTION: Performed by: NURSE PRACTITIONER

## 2022-10-03 PROCEDURE — 93306 TTE W/DOPPLER COMPLETE: CPT

## 2022-10-03 RX ADMIN — PERFLUTREN 1.5 ML: 6.52 INJECTION, SUSPENSION INTRAVENOUS at 09:01

## 2022-10-03 NOTE — PROGRESS NOTES
Please let the patient know that her echo findings show normal heart function with no significant valve abnormalities. Thank you!

## 2023-01-05 ENCOUNTER — OFFICE VISIT (OUTPATIENT)
Dept: ORTHOPEDIC SURGERY | Facility: CLINIC | Age: 63
End: 2023-01-05
Payer: COMMERCIAL

## 2023-01-05 VITALS — TEMPERATURE: 98.6 F | WEIGHT: 245.1 LBS | BODY MASS INDEX: 39.39 KG/M2 | HEIGHT: 66 IN

## 2023-01-05 DIAGNOSIS — S86.911A KNEE STRAIN, RIGHT, INITIAL ENCOUNTER: ICD-10-CM

## 2023-01-05 DIAGNOSIS — M25.561 RIGHT KNEE PAIN, UNSPECIFIED CHRONICITY: Primary | ICD-10-CM

## 2023-01-05 DIAGNOSIS — M17.11 ARTHRITIS OF RIGHT KNEE: ICD-10-CM

## 2023-01-05 PROCEDURE — 73562 X-RAY EXAM OF KNEE 3: CPT | Performed by: ORTHOPAEDIC SURGERY

## 2023-01-05 PROCEDURE — 99213 OFFICE O/P EST LOW 20 MIN: CPT | Performed by: ORTHOPAEDIC SURGERY

## 2023-01-05 RX ORDER — ERGOCALCIFEROL 1.25 MG/1
50000 CAPSULE ORAL
COMMUNITY
Start: 2022-12-06 | End: 2023-02-28

## 2023-01-05 NOTE — PROGRESS NOTES
Patient Name: Jemal Serrano   YOB: 1960  Referring Primary Care Physician: Monse Jacques APRN  BMI: Body mass index is 39.56 kg/m².    Chief Complaint:    Chief Complaint   Patient presents with   • Right Knee - Follow-up        HPI:     Jemal Serrano is a 62 y.o. female who presents today for evaluation of   Chief Complaint   Patient presents with   • Right Knee - Follow-up   .  Patient is seen today complaining of left knee pain.  I saw her in the past right knee pain she is just in [place]'s mother-in-law.  She said Friday which is almost a week ago she was on steps and kind of twisted her knee and it felt painful.  She says since that time is about 60% improved she been taking up to 800 mg of ibuprofen and Tylenol 3 times a day and it seems to have helped.  Is really not locking or catching      Subjective   Medications:   Home Medications:  Current Outpatient Medications on File Prior to Visit   Medication Sig   • DULoxetine (CYMBALTA) 60 MG capsule    • ergocalciferol (ERGOCALCIFEROL) 1.25 MG (96968 UT) capsule Take 50,000 Units by mouth.   • ibuprofen (ADVIL,MOTRIN) 200 MG tablet Take 200 mg by mouth Every 6 (Six) Hours As Needed for Mild Pain .   • metFORMIN ER (GLUCOPHAGE-XR) 500 MG 24 hr tablet    • rosuvastatin (CRESTOR) 20 MG tablet    • SITagliptin (JANUVIA) 100 MG tablet    • predniSONE (DELTASONE) 20 MG tablet      No current facility-administered medications on file prior to visit.     Current Medications:  Scheduled Meds:  Continuous Infusions:No current facility-administered medications for this visit.    PRN Meds:.    I have reviewed the patient's medical history in detail and updated the computerized patient record.  Review and summarization of old records includes:    Past Medical History:   Diagnosis Date   • Anxiety    • Aortic aneurysm (HCC)     followed by [facility], Dr. Rodas   • Jones esophagus    • Colon polyp 2016    tubular adenoma   • Depression    •  Diabetes mellitus (HCC)    • Fatty liver    • GERD (gastroesophageal reflux disease)    • Hyperlipidemia    • Hypertension    • Melanoma (HCC)     s/p surgical removal, followed by [facility]   • Pyelonephritis    • Sleep apnea     compliant with CPAP   • Thyroid nodule    • Visual impairment     followed by [facility]        Past Surgical History:   Procedure Laterality Date   • APPENDECTOMY     • BARIATRIC SURGERY     •  SECTION      x 2   • HYSTERECTOMY      Complete hysterectomy due to a large ovarian cyst and menorrhagia   • LAPAROSCOPIC APPENDECTOMY     • LUMBAR DISC SURGERY     • UMBILICAL HERNIA REPAIR          Social History     Occupational History   • Not on file   Tobacco Use   • Smoking status: Never   • Smokeless tobacco: Never   Substance and Sexual Activity   • Alcohol use: No   • Drug use: Never   • Sexual activity: Not on file      Social History     Social History Narrative    Works for [employer]. Lives with .         Family History   Problem Relation Age of Onset   • Irregular heart beat Mother    • Hypertension Father    • Breast cancer Sister    • Irregular heart beat Sister    • Leukemia Maternal Grandmother    • Heart attack Paternal Grandfather        ROS: 14 point review of systems was performed and all other systems were reviewed and are negative except for documented findings in HPI and today's encounter.     Allergies:   Allergies   Allergen Reactions   • Adhesive Tape Rash   • Codeine Nausea And Vomiting   • Latex Rash   • Sulfa Antibiotics Itching     Constitutional:  Denies fever, shaking or chills   Eyes:  Denies change in visual acuity   HENT:  Denies nasal congestion or sore throat   Respiratory:  Denies cough or shortness of breath   Cardiovascular:  Denies chest pain or severe LE edema   GI:  Denies abdominal pain, nausea, vomiting, bloody stools or diarrhea   Musculoskeletal:  Numbness, tingling, pain, or loss of motor function only as noted  above in history of present illness.  : Denies painful urination or hematuria  Integument:  Denies rash, lesion or ulceration   Neurologic:  Denies headache or focal weakness  Endocrine:  Denies lymphadenopathy  Psych:  Denies confusion or change in mental status   Hem:  Denies active bleeding    OBJECTIVE:  Physical Exam: 62 y.o. female  Wt Readings from Last 3 Encounters:   01/05/23 111 kg (245 lb 1.6 oz)   10/03/22 112 kg (246 lb)   09/14/22 112 kg (246 lb)     Ht Readings from Last 1 Encounters:   01/05/23 167.6 cm (66\")     Body mass index is 39.56 kg/m².  Vitals:    01/05/23 1006   Temp: 98.6 °F (37 °C)     Vital signs reviewed.     General Appearance:    Alert, cooperative, in no acute distress                  Eyes: conjunctiva clear  ENT: external ears and nose atraumatic  CV: no peripheral edema  Resp: normal respiratory effort  Skin: no rashes or wounds; normal turgor  Psych: mood and affect appropriate  Lymph: no nodes appreciated  Neuro: gross sensation intact  Vascular:  Palpable peripheral pulse in noted extremity  Musculoskeletal Extremities: Exam today shows pleasant lady moderate swelling in the knee and some diffuse joint line tenderness Andria's is negative ligamentous exam slight pseudolaxity and she has good range of motion    Radiology:   AP lateral 40 degree PA x-ray of her right knee taken the office today for complaints of pain with partial comparison views available show arthritis        Assessment:     ICD-10-CM ICD-9-CM   1. Right knee pain, unspecified chronicity  M25.561 719.46   2. Arthritis of right knee  M17.11 716.96   3. Knee strain, right, initial encounter  S86.911A 844.9        MDM/Plan:   The diagnosis(es), natural history, pathophysiology and treatment for diagnosis(es) were discussed. Opportunity given and questions answered.  Biomechanics of pertinent body areas discussed.  When appropriate, the use of ambulatory aids discussed.    BMI:  The concept of BMI body mass  index and its importance and implications discussed.    MEDICATIONS:  The risks, benefits, warnings,side effects and alternatives of medications discussed.  Inflammation/pain control; with cold, heat, elevation and/or liniments discussed as appropriate  MEDICAL RECORDS reviewed from other provider(s) for past and current medical history pertinent to this complaint.  It is greatly improved with anti-inflammatory treatment.  Suggest she continue with the same.  If is getting worse or not getting better be glad to see her back I think with a 60% improvement side of the week we could hold on any kind of injection etc. and she agreed see her back if she is having problems    1/5/2023    Dictated utilizing Dragon dictation

## 2023-03-21 ENCOUNTER — OFFICE VISIT (OUTPATIENT)
Dept: CARDIOLOGY | Facility: CLINIC | Age: 63
End: 2023-03-21
Payer: COMMERCIAL

## 2023-03-21 VITALS
SYSTOLIC BLOOD PRESSURE: 160 MMHG | HEART RATE: 77 BPM | BODY MASS INDEX: 38.92 KG/M2 | WEIGHT: 242.2 LBS | DIASTOLIC BLOOD PRESSURE: 110 MMHG | HEIGHT: 66 IN

## 2023-03-21 DIAGNOSIS — I10 ESSENTIAL HYPERTENSION: Primary | ICD-10-CM

## 2023-03-21 DIAGNOSIS — E78.00 PURE HYPERCHOLESTEROLEMIA: ICD-10-CM

## 2023-03-21 DIAGNOSIS — R00.2 PALPITATIONS: ICD-10-CM

## 2023-03-21 DIAGNOSIS — I77.810 DILATATION OF THORACIC AORTA: ICD-10-CM

## 2023-03-21 PROCEDURE — 93000 ELECTROCARDIOGRAM COMPLETE: CPT | Performed by: INTERNAL MEDICINE

## 2023-03-21 PROCEDURE — 99214 OFFICE O/P EST MOD 30 MIN: CPT | Performed by: INTERNAL MEDICINE

## 2023-03-21 RX ORDER — VALSARTAN AND HYDROCHLOROTHIAZIDE 80; 12.5 MG/1; MG/1
1 TABLET, FILM COATED ORAL DAILY
Qty: 90 TABLET | Refills: 3 | Status: SHIPPED | OUTPATIENT
Start: 2023-03-21

## 2023-03-21 NOTE — PROGRESS NOTES
PATIENTINFORMATION    Date of Office Visit: 2023  Encounter Provider: Arnulfo Nice MD  Place of Service: [facility] CARDIOLOGY  Patient Name: Jemal Serrano  : 1960    Subjective:     Encounter Date:2023      Patient ID: Jemal Serrano is a 62 y.o. female.    No chief complaint on file.    HPI  Ms. Serrano is a pleasant 62 years old lady who came to cardiac clinic for follow-up visit.  She still gets occasional palpitations without associated symptoms like presyncope or syncope.  Even if she does not exercise regularly she is fairly active and denies exertional symptoms.  Does not check blood pressure at home regularly but usually elevated when she checks it.  Not on antihypertensives.  She was diagnosed with pulmonary sarcoidosis which was initially treated with steroids but discontinued and she also sees rheumatologist.  She denies chest pain, shortness of breath, orthopnea, PND, presyncope or syncope.    She went to the ER few days ago for evaluation of nausea and vomiting.    ROS  All systems reviewed and negative except as noted in HPI.    Past Medical History:   Diagnosis Date   • Anxiety    • Aortic aneurysm (HCC)     followed by [facility], Dr. Rodas   • Jones esophagus    • Colon polyp 2016    tubular adenoma   • Depression    • Diabetes mellitus (HCC)    • Fatty liver    • GERD (gastroesophageal reflux disease)    • Hyperlipidemia    • Hypertension    • Melanoma (HCC)     s/p surgical removal, followed by [facility]   • Pyelonephritis    • Sleep apnea     compliant with CPAP   • Thyroid nodule    • Visual impairment     followed by [facility]       Past Surgical History:   Procedure Laterality Date   • APPENDECTOMY     • BARIATRIC SURGERY     •  SECTION      x 2   • HYSTERECTOMY      Complete hysterectomy due to a large ovarian cyst and menorrhagia   • LAPAROSCOPIC APPENDECTOMY     • LUMBAR DISC SURGERY     • UMBILICAL  HERNIA REPAIR         Social History     Socioeconomic History   • Marital status:    Tobacco Use   • Smoking status: Never   • Smokeless tobacco: Never   Substance and Sexual Activity   • Alcohol use: No   • Drug use: Never       Family History   Problem Relation Age of Onset   • Irregular heart beat Mother    • Hypertension Father    • Breast cancer Sister    • Irregular heart beat Sister    • Leukemia Maternal Grandmother    • Heart attack Paternal Grandfather            ECG 12 Lead    Date/Time: 3/21/2023 8:36 AM  Performed by: Arnulfo Nice MD  Authorized by: Arnulfo Nice MD   Comparison: compared with previous ECG from 9/14/2022  Similar to previous ECG  Comparison to previous ECG: Right bundle branch block is new since prior tracing   Rhythm: sinus rhythm  Rate: normal  Conduction: right bundle branch block  ST Segments: ST segments normal  T Waves: T waves normal  QRS axis: normal  Other: no other findings    Clinical impression: normal ECG               Objective:     There were no vitals taken for this visit. There is no height or weight on file to calculate BMI.     Constitutional:       General: Not in acute distress.     Appearance: Well-developed. Not diaphoretic.   Eyes:      Pupils: Pupils are equal, round, and reactive to light.   HENT:      Head: Normocephalic and atraumatic.   Neck:      Thyroid: No thyromegaly.   Pulmonary:      Effort: Pulmonary effort is normal. No respiratory distress.      Breath sounds: Normal breath sounds. No wheezing. No rales.   Chest:      Chest wall: Not tender to palpatation.   Cardiovascular:      Normal rate. Regular rhythm.      No gallop.   Pulses:     Intact distal pulses.   Edema:     Peripheral edema absent.   Abdominal:      General: Bowel sounds are normal. There is no distension.      Palpations: Abdomen is soft.      Tenderness: There is no guarding.   Musculoskeletal: Normal range of motion.         General: No deformity.       Cervical back: Normal range of motion and neck supple. Skin:     General: Skin is warm and dry.      Findings: No rash.   Neurological:      Mental Status: Alert and oriented to person, place, and time.      Cranial Nerves: No cranial nerve deficit.      Deep Tendon Reflexes: Reflexes are normal and symmetric.   Psychiatric:         Judgment: Judgment normal.         Review Of Data: I have reviewed pertinent recent labs, images and documents and pertinent findings included in HPI or assessment below.          Assessment/Plan:         1. History of palpitation-unremarkable Holter   2. Essential hypertension with mild left ventricular hypertrophy on echocardiogram.  Currently not on treatment.  She had cough with lisinopril in the past.  I have started her on valsartan/hydrochlorothiazide and she will start to check blood pressure regularly.  BMP in 2 weeks.  She will come to office with blood pressure log in 1 month.  3. Obesity/sleep apnea-compliant with CPAP.  She will start walk regularly.  4. Dilated ascending aorta-no significant dilatation on echo done recently.  5. Pulmonary sarcoidosis-follows up with pulmonary  6. Hypercholesterolemia-on statin-lipid panel at goal.  7. Type 2 diabetes mellitus-controlled  8. Right bundle branch block that is relatively new      Diagnosis and plan of care discussed with patient and verbalized understanding.            Your medication list          Accurate as of March 21, 2023  8:42 AM. If you have any questions, ask your nurse or doctor.            CONTINUE taking these medications      Instructions Last Dose Given Next Dose Due   DULoxetine 60 MG capsule  Commonly known as: CYMBALTA           ibuprofen 200 MG tablet  Commonly known as: ADVIL,MOTRIN      Take 200 mg by mouth Every 6 (Six) Hours As Needed for Mild Pain .       metFORMIN  MG 24 hr tablet  Commonly known as: GLUCOPHAGE-XR           predniSONE 20 MG tablet  Commonly known as: DELTASONE            rosuvastatin 20 MG tablet  Commonly known as: CRESTOR           SITagliptin 100 MG tablet  Commonly known as: DON Nice MD  03/21/23  08:42 EDT

## 2023-04-24 ENCOUNTER — OFFICE VISIT (OUTPATIENT)
Dept: ORTHOPEDIC SURGERY | Facility: CLINIC | Age: 63
End: 2023-04-24
Payer: COMMERCIAL

## 2023-04-24 VITALS — HEIGHT: 66 IN | TEMPERATURE: 97.8 F | BODY MASS INDEX: 38.31 KG/M2 | WEIGHT: 238.4 LBS

## 2023-04-24 DIAGNOSIS — M17.11 ARTHRITIS OF RIGHT KNEE: Primary | ICD-10-CM

## 2023-04-24 PROCEDURE — 99213 OFFICE O/P EST LOW 20 MIN: CPT | Performed by: ORTHOPAEDIC SURGERY

## 2023-04-24 RX ORDER — ONDANSETRON 4 MG/1
TABLET, ORALLY DISINTEGRATING ORAL
COMMUNITY
Start: 2023-03-21

## 2023-04-24 RX ORDER — METFORMIN HYDROCHLORIDE 500 MG/1
1000 TABLET, EXTENDED RELEASE ORAL
COMMUNITY

## 2023-04-24 NOTE — PROGRESS NOTES
Patient Name: Jemal Serrano   YOB: 1960  Referring Primary Care Physician: Monse Jacques APRN  BMI: Body mass index is 38.48 kg/m².    Chief Complaint:    Chief Complaint   Patient presents with   • Right Knee - Follow-up, Pain        HPI:     Jemal Serrano is a 62 y.o. female who presents today for evaluation of   Chief Complaint   Patient presents with   • Right Knee - Follow-up, Pain   .  Patient is seen today complaining of her right knee.  She has had arthritic pain off and on and it and has been doing pretty well starting to bother her and gets achy and stiff when she first gets up to walk lately.  She is just in [place]'s mother.  She has that she is having a cholecystectomy on Wednesday by Dr. Tommy Erazo      Subjective   Medications:   Home Medications:  Current Outpatient Medications on File Prior to Visit   Medication Sig   • DULoxetine (CYMBALTA) 60 MG capsule    • rosuvastatin (CRESTOR) 20 MG tablet    • SITagliptin (JANUVIA) 100 MG tablet    • valsartan-hydrochlorothiazide (Diovan HCT) 80-12.5 MG per tablet Take 1 tablet by mouth Daily.   • [DISCONTINUED] metFORMIN ER (GLUCOPHAGE-XR) 500 MG 24 hr tablet    • metFORMIN ER (GLUCOPHAGE-XR) 500 MG 24 hr tablet Take 2 tablets by mouth.   • ondansetron ODT (ZOFRAN-ODT) 4 MG disintegrating tablet    • predniSONE (DELTASONE) 20 MG tablet  (Patient not taking: Reported on 3/21/2023)   • [DISCONTINUED] ibuprofen (ADVIL,MOTRIN) 200 MG tablet Take 1 tablet by mouth Every 6 (Six) Hours As Needed for Mild Pain.     No current facility-administered medications on file prior to visit.     Current Medications:  Scheduled Meds:  Continuous Infusions:No current facility-administered medications for this visit.    PRN Meds:.    I have reviewed the patient's medical history in detail and updated the computerized patient record.  Review and summarization of old records includes:    Past Medical History:   Diagnosis Date   • Anxiety    • Aortic  aneurysm     followed by [facility], Dr. Rodas   • Jones esophagus    • Colon polyp 2016    tubular adenoma   • Depression    • Diabetes mellitus    • Fatty liver    • GERD (gastroesophageal reflux disease)    • Hyperlipidemia    • Hypertension    • Melanoma     s/p surgical removal, followed by [facility]   • Pyelonephritis    • Sleep apnea     compliant with CPAP   • Thyroid nodule    • Visual impairment     followed by [facility]        Past Surgical History:   Procedure Laterality Date   • APPENDECTOMY     • BARIATRIC SURGERY     •  SECTION      x 2   • HYSTERECTOMY      Complete hysterectomy due to a large ovarian cyst and menorrhagia   • LAPAROSCOPIC APPENDECTOMY     • LUMBAR DISC SURGERY     • UMBILICAL HERNIA REPAIR          Social History     Occupational History   • Not on file   Tobacco Use   • Smoking status: Never   • Smokeless tobacco: Never   Substance and Sexual Activity   • Alcohol use: No   • Drug use: Never   • Sexual activity: Not on file      Social History     Social History Narrative    Works for [employer]. Lives with .         Family History   Problem Relation Age of Onset   • Irregular heart beat Mother    • Hypertension Father    • Breast cancer Sister    • Irregular heart beat Sister    • Leukemia Maternal Grandmother    • Heart attack Paternal Grandfather        ROS: 14 point review of systems was performed and all other systems were reviewed and are negative except for documented findings in HPI and today's encounter.     Allergies:   Allergies   Allergen Reactions   • Sulfamethoxazole-Trimethoprim Itching   • Adhesive Tape Rash     Redness when left on a while CAN USE PAPER TAPE   • Codeine Nausea And Vomiting   • Latex Rash     Itching   • Sulfa Antibiotics Itching   • Wound Dressing Adhesive Other (See Comments)     Redness when left on a while CAN USE PAPER TAPE  Other reaction(s): Other (See Comments)  Redness when left on a while CAN USE  "PAPER TAPE  Redness when left on a while CAN USE PAPER TAPE       Constitutional:  Denies fever, shaking or chills   Eyes:  Denies change in visual acuity   HENT:  Denies nasal congestion or sore throat   Respiratory:  Denies cough or shortness of breath   Cardiovascular:  Denies chest pain or severe LE edema   GI:  Denies abdominal pain, nausea, vomiting, bloody stools or diarrhea   Musculoskeletal:  Numbness, tingling, pain, or loss of motor function only as noted above in history of present illness.  : Denies painful urination or hematuria  Integument:  Denies rash, lesion or ulceration   Neurologic:  Denies headache or focal weakness  Endocrine:  Denies lymphadenopathy  Psych:  Denies confusion or change in mental status   Hem:  Denies active bleeding    OBJECTIVE:  Physical Exam: 62 y.o. female  Wt Readings from Last 3 Encounters:   04/24/23 108 kg (238 lb 6.4 oz)   03/21/23 110 kg (242 lb 3.2 oz)   01/05/23 111 kg (245 lb 1.6 oz)     Ht Readings from Last 1 Encounters:   04/24/23 167.6 cm (66\")     Body mass index is 38.48 kg/m².  Vitals:    04/24/23 1111   Temp: 97.8 °F (36.6 °C)     Vital signs reviewed.     General Appearance:    Alert, cooperative, in no acute distress                  Eyes: conjunctiva clear  ENT: external ears and nose atraumatic  CV: no peripheral edema  Resp: normal respiratory effort  Skin: no rashes or wounds; normal turgor  Psych: mood and affect appropriate  Lymph: no nodes appreciated  Neuro: gross sensation intact  Vascular:  Palpable peripheral pulse in noted extremity  Musculoskeletal Extremities: Exam today shows swelling crepitation synovitis and some joint line tenderness and stiffness in her right knee may have a Baker's cyst.  Hips noncontributory    Radiology:   AP lateral 40 degree PA x-rays from January 5 viewed at this time with comparison view show arthritis in the knee with a varus pattern recommend anti-inflammatory control.  Should not take anti-inflammatory " "medicines however but could use ice heat and liniments.  She could resume anti-inflammatories once deemed safe to do so by Dr. Erazo after surgery.  Hesitant to introduce steroids into her body as she is not had cortisone shots before but does have the upcoming surgery.  Once again if it bothers her enough when cleared from Dr. Erazo's point of view can see her back for cortisone injection        Assessment:     ICD-10-CM ICD-9-CM   1. Arthritis of right knee  M17.11 716.96        MDM/Plan:   The diagnosis(es), natural history, pathophysiology and treatment for diagnosis(es) were discussed. Opportunity given and questions answered.  Biomechanics of pertinent body areas discussed.  When appropriate, the use of ambulatory aids discussed.    Biomechanics of pertinent body areas discussed.  When appropriate, the use of ambulatory aids discussed.  BMI:  The concept of BMI body mass index and its importance and implications discussed.    MEDICATIONS:  The risks, benefits, warnings,side effects and alternatives of medications discussed.  Inflammation/pain control; with cold, heat, elevation and/or liniments discussed as appropriate  MEDICAL RECORDS reviewed from other provider(s) for past and current medical history pertinent to this complaint.  See above under \"radiology\"    4/24/2023    Dictated utilizing Dragon dictation    "

## 2023-08-23 ENCOUNTER — APPOINTMENT (OUTPATIENT)
Dept: WOMENS IMAGING | Facility: HOSPITAL | Age: 63
End: 2023-08-23
Payer: COMMERCIAL

## 2023-08-23 PROCEDURE — 77063 BREAST TOMOSYNTHESIS BI: CPT | Performed by: RADIOLOGY

## 2023-08-23 PROCEDURE — 77067 SCR MAMMO BI INCL CAD: CPT | Performed by: RADIOLOGY

## 2024-03-19 RX ORDER — VALSARTAN AND HYDROCHLOROTHIAZIDE 80; 12.5 MG/1; MG/1
1 TABLET, FILM COATED ORAL DAILY
Qty: 90 TABLET | Refills: 3 | Status: SHIPPED | OUTPATIENT
Start: 2024-03-19

## 2024-03-29 ENCOUNTER — OFFICE VISIT (OUTPATIENT)
Dept: CARDIOLOGY | Facility: CLINIC | Age: 64
End: 2024-03-29
Payer: COMMERCIAL

## 2024-03-29 VITALS
WEIGHT: 236 LBS | HEIGHT: 66 IN | HEART RATE: 68 BPM | SYSTOLIC BLOOD PRESSURE: 130 MMHG | BODY MASS INDEX: 37.93 KG/M2 | OXYGEN SATURATION: 98 % | DIASTOLIC BLOOD PRESSURE: 90 MMHG

## 2024-03-29 DIAGNOSIS — E78.2 MIXED HYPERLIPIDEMIA: ICD-10-CM

## 2024-03-29 DIAGNOSIS — R00.2 PALPITATIONS: ICD-10-CM

## 2024-03-29 DIAGNOSIS — D86.9 SARCOIDOSIS: ICD-10-CM

## 2024-03-29 DIAGNOSIS — I10 ESSENTIAL HYPERTENSION: Primary | ICD-10-CM

## 2024-03-29 PROCEDURE — 99214 OFFICE O/P EST MOD 30 MIN: CPT | Performed by: INTERNAL MEDICINE

## 2024-03-29 PROCEDURE — 93000 ELECTROCARDIOGRAM COMPLETE: CPT | Performed by: INTERNAL MEDICINE

## 2024-03-29 NOTE — PROGRESS NOTES
PATIENTINFORMATION    Date of Office Visit: 2024  Encounter Provider: Arnulfo Nice MD  Place of Service: [facility] CARDIOLOGY  Patient Name: Jemal Serrano  : 1960    Subjective:     Encounter Date:2024      Patient ID: Jemal Serrano is a 63 y.o. female.    No chief complaint on file.    HPI  Ms. Serrano is a pleasant 64 yo lady who is here for fu visit.  She had emergency bowel surgery and resection for bowel obstruction at the end of last year.  She also had choledocholithiasis.  She has not been ambulating/walking much since then.  She is planning on increasing level of activity.  She reports chronic fatigue but denies chest pain, palpitations, presyncope syncope or significant lower extremity swelling.  Compliant with current medications without significant side effects.      ROS  All systems reviewed and negative except as noted in HPI.    Past Medical History:   Diagnosis Date    Anxiety     Aortic aneurysm     followed by [facility], Dr. Rodas    Jones esophagus     Colon polyp 2016    tubular adenoma    Depression     Diabetes mellitus     Fatty liver     GERD (gastroesophageal reflux disease)     Hyperlipidemia     Hypertension     Melanoma     s/p surgical removal, followed by [facility]    Pyelonephritis     Sleep apnea     compliant with CPAP    Thyroid nodule     Visual impairment     followed by [facility]       Past Surgical History:   Procedure Laterality Date    APPENDECTOMY      BARIATRIC SURGERY       SECTION      x 2    HYSTERECTOMY      Complete hysterectomy due to a large ovarian cyst and menorrhagia    LAPAROSCOPIC APPENDECTOMY      LUMBAR DISC SURGERY      UMBILICAL HERNIA REPAIR         Social History     Socioeconomic History    Marital status:    Tobacco Use    Smoking status: Never    Smokeless tobacco: Never   Substance and Sexual Activity    Alcohol use: No    Drug use: Never       Family History  "  Problem Relation Age of Onset    Irregular heart beat Mother     Hypertension Father     Breast cancer Sister     Irregular heart beat Sister     Leukemia Maternal Grandmother     Heart attack Paternal Grandfather            ECG 12 Lead    Date/Time: 3/29/2024 2:51 PM  Performed by: Arnulfo Nice MD    Authorized by: Arnulfo Nice MD  Comparison: compared with previous ECG from 3/21/2023  Comparison to previous ECG: RBBB not present on this tracing   Rhythm: sinus rhythm  Rate: normal  Conduction: conduction normal  ST Segments: ST segments normal  T Waves: T waves normal  QRS axis: normal  Other: no other findings    Clinical impression: normal ECG             Objective:     /90   Pulse 68   Ht 167.6 cm (66\")   Wt 107 kg (236 lb)   SpO2 98%   BMI 38.09 kg/m²  Body mass index is 38.09 kg/m².     Constitutional:       General: Not in acute distress.     Appearance: Morbidly obese. Not diaphoretic.   Eyes:      Pupils: Pupils are equal, round, and reactive to light.   HENT:      Head: Normocephalic and atraumatic.   Neck:      Thyroid: No thyromegaly.   Pulmonary:      Effort: Pulmonary effort is normal. No respiratory distress.      Breath sounds: Normal breath sounds. No wheezing. No rales.   Chest:      Chest wall: Not tender to palpatation.   Cardiovascular:      Normal rate. Regular rhythm.      No gallop.    Pulses:     Intact distal pulses.   Edema:     Peripheral edema absent.   Abdominal:      General: Bowel sounds are normal. There is no distension.      Palpations: Abdomen is soft.      Tenderness: There is no guarding.   Musculoskeletal: Normal range of motion.         General: No deformity.      Cervical back: Normal range of motion and neck supple. Skin:     General: Skin is warm and dry.      Findings: No rash.   Neurological:      Mental Status: Alert and oriented to person, place, and time.      Cranial Nerves: No cranial nerve deficit.      Deep Tendon Reflexes: Reflexes " are normal and symmetric.   Psychiatric:         Judgment: Judgment normal.         Review Of Data: I have reviewed pertinent recent labs, images and documents and pertinent findings included in HPI or assessment below.    Lipid Panel          10/10/2023    05:09   Lipid Panel   Triglycerides 119          Details          This result is from an external source.                 Assessment/Plan:         Assessment  History of palpitation-unremarkable Holter   Essential hypertension with mild left ventricular hypertrophy on echocardiogram.  On valsartan and hydrochlorothiazide  Obesity/sleep apnea-compliant with CPAP.    Dilated ascending aorta-no significant dilatation on echo done recently.  Systemic sarcoidosis with pulmonary and cardiac involvement-she had a cardiac PET scan at [facility]-mild cardiac involvement  Hypercholesterolemia-on statin-lipid panel at goal.  Type 2 diabetes mellitus-controlled  Right bundle branch block -intermittent    Vital signs within range.  EKG did not show right bundle branch block today.  She denies any significant persistent palpitations.  Because of cardiac sarcoidosis I will send her out on a Holter monitor to rule out significant arrhythmia.  No evidence for heart failure  Newly controlled hypertension.  Continue current care  Follow-up in 6 months or sooner with any concerning issues      Diagnosis and plan of care discussed with patient and verbalized understanding.            Your medication list            Accurate as of March 29, 2024  3:54 PM. If you have any questions, ask your nurse or doctor.                CONTINUE taking these medications        Instructions Last Dose Given Next Dose Due   DULoxetine 60 MG capsule  Commonly known as: CYMBALTA           metFORMIN  MG 24 hr tablet  Commonly known as: GLUCOPHAGE-XR      Take 2 tablets by mouth.       ondansetron ODT 4 MG disintegrating tablet  Commonly known as: ZOFRAN-ODT           rosuvastatin 20 MG  tablet  Commonly known as: CRESTOR           valsartan-hydrochlorothiazide 80-12.5 MG per tablet  Commonly known as: DIOVAN-HCT      TAKE 1 TABLET BY MOUTH DAILY              STOP taking these medications      predniSONE 20 MG tablet  Commonly known as: DELTASONE  Stopped by: MD Arnulfo Bedoya MD  03/29/24  15:54 EDT

## 2024-05-03 ENCOUNTER — TELEPHONE (OUTPATIENT)
Dept: CARDIOLOGY | Facility: HOSPITAL | Age: 64
End: 2024-05-03
Payer: COMMERCIAL

## 2024-05-03 NOTE — TELEPHONE ENCOUNTER
----- Message from Miladys PORTER sent at 5/3/2024  9:06 AM EDT -----    ----- Message -----  From: Arnulfo Nice MD  Sent: 5/3/2024   9:04 AM EDT  To: Chelsie Mcnamara II, MA    Please notify Ms. Serrano that Holter shows only mild abnormalities that do not need treatment.  Continue current care.  Let me know if she has any questions.  Thank you

## 2024-05-03 NOTE — TELEPHONE ENCOUNTER
Results and recommendations reviewed with the patient. Patient verbalized understanding. Denied further questions at this time.    Miladys Smith RN  [place] [facility] [place]

## 2024-10-17 ENCOUNTER — APPOINTMENT (OUTPATIENT)
Dept: WOMENS IMAGING | Facility: HOSPITAL | Age: 64
End: 2024-10-17
Payer: COMMERCIAL

## 2024-10-17 PROCEDURE — 77063 BREAST TOMOSYNTHESIS BI: CPT | Performed by: RADIOLOGY

## 2024-10-17 PROCEDURE — 77067 SCR MAMMO BI INCL CAD: CPT | Performed by: RADIOLOGY
